# Patient Record
Sex: FEMALE
[De-identification: names, ages, dates, MRNs, and addresses within clinical notes are randomized per-mention and may not be internally consistent; named-entity substitution may affect disease eponyms.]

---

## 2022-01-28 ENCOUNTER — HOSPITAL ENCOUNTER (EMERGENCY)
Dept: HOSPITAL 5 - ED | Age: 77
LOS: 1 days | Discharge: PSYCHIATRIC OR SUBSTANCE USE FACILITY | End: 2022-01-29
Payer: MEDICARE

## 2022-01-28 DIAGNOSIS — F25.9: ICD-10-CM

## 2022-01-28 DIAGNOSIS — F31.9: Primary | ICD-10-CM

## 2022-01-28 DIAGNOSIS — N39.0: ICD-10-CM

## 2022-01-28 DIAGNOSIS — G93.41: ICD-10-CM

## 2022-01-28 DIAGNOSIS — Z20.822: ICD-10-CM

## 2022-01-28 LAB
ALBUMIN SERPL-MCNC: 3.7 G/DL (ref 3.9–5)
ALT SERPL-CCNC: 13 UNITS/L (ref 7–56)
BACTERIA #/AREA URNS HPF: (no result) /HPF
BASOPHILS # (AUTO): 0 K/MM3 (ref 0–0.1)
BASOPHILS NFR BLD AUTO: 0.5 % (ref 0–1.8)
BILIRUB DIRECT SERPL-MCNC: < 0.2 MG/DL (ref 0–0.2)
BILIRUB UR QL STRIP: (no result)
BLOOD UR QL VISUAL: (no result)
BUN SERPL-MCNC: 19 MG/DL (ref 7–17)
BUN/CREAT SERPL: 27 %
CALCIUM SERPL-MCNC: 9.3 MG/DL (ref 8.4–10.2)
EOSINOPHIL # BLD AUTO: 0 K/MM3 (ref 0–0.4)
EOSINOPHIL NFR BLD AUTO: 0.1 % (ref 0–4.3)
HCT VFR BLD CALC: 39.1 % (ref 30.3–42.9)
HEMOLYSIS INDEX: 38
HGB BLD-MCNC: 13 GM/DL (ref 10.1–14.3)
LYMPHOCYTES # BLD AUTO: 1.4 K/MM3 (ref 1.2–5.4)
LYMPHOCYTES NFR BLD AUTO: 16.1 % (ref 13.4–35)
MCHC RBC AUTO-ENTMCNC: 33 % (ref 30–34)
MCV RBC AUTO: 88 FL (ref 79–97)
MONOCYTES # (AUTO): 0.6 K/MM3 (ref 0–0.8)
MONOCYTES % (AUTO): 7.6 % (ref 0–7.3)
MUCOUS THREADS #/AREA URNS HPF: (no result) /HPF
PH UR STRIP: 5 [PH] (ref 5–7)
PLATELET # BLD: 274 K/MM3 (ref 140–440)
RBC # BLD AUTO: 4.44 M/MM3 (ref 3.65–5.03)
RBC #/AREA URNS HPF: 2 /HPF (ref 0–6)
UROBILINOGEN UR-MCNC: < 2 MG/DL (ref ?–2)
WBC #/AREA URNS HPF: 14 /HPF (ref 0–6)

## 2022-01-28 PROCEDURE — 87076 CULTURE ANAEROBE IDENT EACH: CPT

## 2022-01-28 PROCEDURE — 96375 TX/PRO/DX INJ NEW DRUG ADDON: CPT

## 2022-01-28 PROCEDURE — 87086 URINE CULTURE/COLONY COUNT: CPT

## 2022-01-28 PROCEDURE — 82140 ASSAY OF AMMONIA: CPT

## 2022-01-28 PROCEDURE — 87186 SC STD MICRODIL/AGAR DIL: CPT

## 2022-01-28 PROCEDURE — 80048 BASIC METABOLIC PNL TOTAL CA: CPT

## 2022-01-28 PROCEDURE — G0480 DRUG TEST DEF 1-7 CLASSES: HCPCS

## 2022-01-28 PROCEDURE — 70450 CT HEAD/BRAIN W/O DYE: CPT

## 2022-01-28 PROCEDURE — 80320 DRUG SCREEN QUANTALCOHOLS: CPT

## 2022-01-28 PROCEDURE — 86403 PARTICLE AGGLUT ANTBDY SCRN: CPT

## 2022-01-28 PROCEDURE — 36415 COLL VENOUS BLD VENIPUNCTURE: CPT

## 2022-01-28 PROCEDURE — 96372 THER/PROPH/DIAG INJ SC/IM: CPT

## 2022-01-28 PROCEDURE — 80076 HEPATIC FUNCTION PANEL: CPT

## 2022-01-28 PROCEDURE — 80307 DRUG TEST PRSMV CHEM ANLYZR: CPT

## 2022-01-28 PROCEDURE — 99285 EMERGENCY DEPT VISIT HI MDM: CPT

## 2022-01-28 PROCEDURE — 85025 COMPLETE CBC W/AUTO DIFF WBC: CPT

## 2022-01-28 PROCEDURE — 96365 THER/PROPH/DIAG IV INF INIT: CPT

## 2022-01-28 PROCEDURE — U0003 INFECTIOUS AGENT DETECTION BY NUCLEIC ACID (DNA OR RNA); SEVERE ACUTE RESPIRATORY SYNDROME CORONAVIRUS 2 (SARS-COV-2) (CORONAVIRUS DISEASE [COVID-19]), AMPLIFIED PROBE TECHNIQUE, MAKING USE OF HIGH THROUGHPUT TECHNOLOGIES AS DESCRIBED BY CMS-2020-01-R: HCPCS

## 2022-01-28 PROCEDURE — 81001 URINALYSIS AUTO W/SCOPE: CPT

## 2022-01-28 NOTE — EMERGENCY DEPARTMENT REPORT
HPI





- General


Chief Complaint: Altered Mental Status


Time Seen by Provider: 01/28/22 11:53





- HPI


HPI: 





76-year-old female with history of dementia and depression brought by EMS from a

nearby [facility] due to altered mental status/bizarre behavior.  Apparently, 

the patient was transferred from [facility] to the [facility] 

and has been there for the past 10 days of isolation.  She has been cleared to 

discontinue her isolation and was discharged but the S team that was supposed 

to transport her refused to do so because the patient became very anxious and 

was with altered mental status.  Every time the patient is asked a question, she

starts to answer for about 1 second but then hyperventilates and stops 

answering.  Calls made to the patient's sister, Deloris were apparently 

unanswered.  Her phone number is [phone].  Further details of the HPI are 

highly limited due to the patient's current clinical condition.





ED Past Medical Hx





- Past Medical History


Previous Medical History?: Yes


Additional medical history: UNKNOWN





- Medications


Home Medications: 


                                Home Medications











 Medication  Instructions  Recorded  Confirmed  Last Taken  Type


 


Celecoxib [celeBREX] 100 mg PO QDAY 01/28/22 01/28/22 Unknown History


 


Gabapentin 300 mg PO BID 01/28/22 01/28/22 Unknown History


 


Quetiapine Fumarate 25 mg PO TID 01/28/22 01/28/22 Unknown History


 


SEROquel 100 mg PO QHS 01/28/22 01/28/22 Unknown History


 


Vitamin D3 5,000 UNIT 5,000 units PO TID 01/28/22 01/28/22 Unknown History














ED Review of Systems


ROS: 


Stated complaint: ALTERED MENTAL STATUS


Other details as noted in HPI





Comment: Unobtainable due to pts medical conditions





Physical Exam





- Physical Exam


Vital Signs: 


                                   Vital Signs











  01/28/22





  11:40


 


Temperature 98.0 F


 


Pulse Rate 107 H


 


Respiratory 18





Rate 


 


Blood Pressure 154/100





[Left] 


 


O2 Sat by Pulse 98





Oximetry 











Physical Exam: 





GENERAL: Well developed and well nourished. No acute distress


HEAD: Normocephalic. No obvious signs of trauma. 


ENT: Dry mucous membranes.


EYES: Extraocular movements are intact. Pupils are equal round and reactive to 

light bilaterally


NECK: Supple. Full ROM is intact. Trachea is midline.


LUNGS: Nonlabored breathing. Equal chest rise bilaterally. Clear to auscultation

 bilaterally.


CARDIOVASCULAR: Regular rate and rhythm. No murmurs or rubs.


VASCULAR: Cap refill < 2 seconds. 1+ edema of the bilateral lower extremities


ABDOMEN: Abdomen is soft and nondistended. There is no significant tenderness, 

guarding or rebound.


SKIN: Skin is warm and dry


NEURO: Patient is awake, alert.  The patient can state her name but when asked 

other questions she begins to answer and then mumbles off.  She does however 

follow directions.. CNs II-XII grossly intact. No focal deficits. Normal motor 

and sensory exam throughout. 


MUSCULOSKELETAL: No obvious deformities. No significant tenderness. Normal ROM 

throughout. 


BACK/SPINE:  No costovertebral angle tenderness.





ED Course


                                   Vital Signs











  01/28/22





  11:40


 


Temperature 98.0 F


 


Pulse Rate 107 H


 


Respiratory 18





Rate 


 


Blood Pressure 154/100





[Left] 


 


O2 Sat by Pulse 98





Oximetry 














ED Medical Decision Making





- Lab Data


Result diagrams: 


                                 01/28/22 13:26





                                 01/29/22 16:47





                                   Lab Results











  01/28/22 01/28/22 01/28/22 Range/Units





  13:26 13:26 13:26 


 


WBC  8.5    (4.5-11.0)  K/mm3


 


RBC  4.44    (3.65-5.03)  M/mm3


 


Hgb  13.0    (10.1-14.3)  gm/dl


 


Hct  39.1    (30.3-42.9)  %


 


MCV  88    (79-97)  fl


 


MCH  29    (28-32)  pg


 


MCHC  33    (30-34)  %


 


RDW  12.1 L    (13.2-15.2)  %


 


Plt Count  274    (140-440)  K/mm3


 


Lymph % (Auto)  16.1    (13.4-35.0)  %


 


Mono % (Auto)  7.6 H    (0.0-7.3)  %


 


Eos % (Auto)  0.1    (0.0-4.3)  %


 


Baso % (Auto)  0.5    (0.0-1.8)  %


 


Lymph # (Auto)  1.4    (1.2-5.4)  K/mm3


 


Mono # (Auto)  0.6    (0.0-0.8)  K/mm3


 


Eos # (Auto)  0.0    (0.0-0.4)  K/mm3


 


Baso # (Auto)  0.0    (0.0-0.1)  K/mm3


 


Seg Neutrophils %  75.7 H    (40.0-70.0)  %


 


Seg Neutrophils #  6.5    (1.8-7.7)  K/mm3


 


Sodium   145   (137-145)  mmol/L


 


Potassium   3.2 L   (3.6-5.0)  mmol/L


 


Chloride   108.8 H   ()  mmol/L


 


Carbon Dioxide   24   (22-30)  mmol/L


 


Anion Gap   15   mmol/L


 


BUN   19 H   (7-17)  mg/dL


 


Creatinine   0.7   (0.6-1.2)  mg/dL


 


Estimated GFR   > 60   ml/min


 


BUN/Creatinine Ratio   27   %


 


Glucose   107 H   ()  mg/dL


 


Calcium   9.3   (8.4-10.2)  mg/dL


 


Total Bilirubin     (0.1-1.2)  mg/dL


 


Direct Bilirubin     (0-0.2)  mg/dL


 


Indirect Bilirubin     mg/dL


 


AST     (5-40)  units/L


 


ALT     (7-56)  units/L


 


Alkaline Phosphatase     ()  units/L


 


Ammonia     (25-60)  umol/L


 


Total Protein     (6.3-8.2)  g/dL


 


Albumin     (3.9-5)  g/dL


 


Albumin/Globulin Ratio     %


 


Urine Color     (Yellow)  


 


Urine Turbidity     (Clear)  


 


Urine pH     (5.0-7.0)  


 


Ur Specific Gravity     (1.003-1.030)  


 


Urine Protein     (Negative)  mg/dL


 


Urine Glucose (UA)     (Negative)  mg/dL


 


Urine Ketones     (Negative)  mg/dL


 


Urine Blood     (Negative)  


 


Urine Nitrite     (Negative)  


 


Urine Bilirubin     (Negative)  


 


Urine Urobilinogen     (<2.0)  mg/dL


 


Ur Leukocyte Esterase     (Negative)  


 


Urine WBC (Auto)     (0.0-6.0)  /HPF


 


Urine RBC (Auto)     (0.0-6.0)  /HPF


 


U Epithel Cells (Auto)     (0-13.0)  /HPF


 


Urine Bacteria (Auto)     (Negative)  /HPF


 


Urine Mucus     /HPF


 


Salicylates    < 0.3 L  (2.8-20.0)  mg/dL


 


Urine Opiates Screen     


 


Urine Methadone Screen     


 


Acetaminophen     (10.0-30.0)  ug/mL


 


Ur Barbiturates Screen     


 


Ur Phencyclidine Scrn     


 


Ur Amphetamines Screen     


 


U Benzodiazepines Scrn     


 


Urine Cocaine Screen     


 


U Marijuana (THC) Screen     


 


Drugs of Abuse Note     


 


Plasma/Serum Alcohol     (0-0.07)  %














  01/28/22 01/28/22 01/28/22 Range/Units





  13:26 13:26 13:26 


 


WBC     (4.5-11.0)  K/mm3


 


RBC     (3.65-5.03)  M/mm3


 


Hgb     (10.1-14.3)  gm/dl


 


Hct     (30.3-42.9)  %


 


MCV     (79-97)  fl


 


MCH     (28-32)  pg


 


MCHC     (30-34)  %


 


RDW     (13.2-15.2)  %


 


Plt Count     (140-440)  K/mm3


 


Lymph % (Auto)     (13.4-35.0)  %


 


Mono % (Auto)     (0.0-7.3)  %


 


Eos % (Auto)     (0.0-4.3)  %


 


Baso % (Auto)     (0.0-1.8)  %


 


Lymph # (Auto)     (1.2-5.4)  K/mm3


 


Mono # (Auto)     (0.0-0.8)  K/mm3


 


Eos # (Auto)     (0.0-0.4)  K/mm3


 


Baso # (Auto)     (0.0-0.1)  K/mm3


 


Seg Neutrophils %     (40.0-70.0)  %


 


Seg Neutrophils #     (1.8-7.7)  K/mm3


 


Sodium     (137-145)  mmol/L


 


Potassium     (3.6-5.0)  mmol/L


 


Chloride     ()  mmol/L


 


Carbon Dioxide     (22-30)  mmol/L


 


Anion Gap     mmol/L


 


BUN     (7-17)  mg/dL


 


Creatinine     (0.6-1.2)  mg/dL


 


Estimated GFR     ml/min


 


BUN/Creatinine Ratio     %


 


Glucose     ()  mg/dL


 


Calcium     (8.4-10.2)  mg/dL


 


Total Bilirubin    0.70  (0.1-1.2)  mg/dL


 


Direct Bilirubin    < 0.2  (0-0.2)  mg/dL


 


Indirect Bilirubin    0.5  mg/dL


 


AST    11  (5-40)  units/L


 


ALT    13  (7-56)  units/L


 


Alkaline Phosphatase    103  ()  units/L


 


Ammonia     (25-60)  umol/L


 


Total Protein    6.9  (6.3-8.2)  g/dL


 


Albumin    3.7 L  (3.9-5)  g/dL


 


Albumin/Globulin Ratio    1.2  %


 


Urine Color     (Yellow)  


 


Urine Turbidity     (Clear)  


 


Urine pH     (5.0-7.0)  


 


Ur Specific Gravity     (1.003-1.030)  


 


Urine Protein     (Negative)  mg/dL


 


Urine Glucose (UA)     (Negative)  mg/dL


 


Urine Ketones     (Negative)  mg/dL


 


Urine Blood     (Negative)  


 


Urine Nitrite     (Negative)  


 


Urine Bilirubin     (Negative)  


 


Urine Urobilinogen     (<2.0)  mg/dL


 


Ur Leukocyte Esterase     (Negative)  


 


Urine WBC (Auto)     (0.0-6.0)  /HPF


 


Urine RBC (Auto)     (0.0-6.0)  /HPF


 


U Epithel Cells (Auto)     (0-13.0)  /HPF


 


Urine Bacteria (Auto)     (Negative)  /HPF


 


Urine Mucus     /HPF


 


Salicylates     (2.8-20.0)  mg/dL


 


Urine Opiates Screen     


 


Urine Methadone Screen     


 


Acetaminophen  5.0 L    (10.0-30.0)  ug/mL


 


Ur Barbiturates Screen     


 


Ur Phencyclidine Scrn     


 


Ur Amphetamines Screen     


 


U Benzodiazepines Scrn     


 


Urine Cocaine Screen     


 


U Marijuana (THC) Screen     


 


Drugs of Abuse Note     


 


Plasma/Serum Alcohol   < 0.01   (0-0.07)  %














  01/28/22 01/28/22 01/28/22 Range/Units





  13:26 Unknown Unknown 


 


WBC     (4.5-11.0)  K/mm3


 


RBC     (3.65-5.03)  M/mm3


 


Hgb     (10.1-14.3)  gm/dl


 


Hct     (30.3-42.9)  %


 


MCV     (79-97)  fl


 


MCH     (28-32)  pg


 


MCHC     (30-34)  %


 


RDW     (13.2-15.2)  %


 


Plt Count     (140-440)  K/mm3


 


Lymph % (Auto)     (13.4-35.0)  %


 


Mono % (Auto)     (0.0-7.3)  %


 


Eos % (Auto)     (0.0-4.3)  %


 


Baso % (Auto)     (0.0-1.8)  %


 


Lymph # (Auto)     (1.2-5.4)  K/mm3


 


Mono # (Auto)     (0.0-0.8)  K/mm3


 


Eos # (Auto)     (0.0-0.4)  K/mm3


 


Baso # (Auto)     (0.0-0.1)  K/mm3


 


Seg Neutrophils %     (40.0-70.0)  %


 


Seg Neutrophils #     (1.8-7.7)  K/mm3


 


Sodium     (137-145)  mmol/L


 


Potassium     (3.6-5.0)  mmol/L


 


Chloride     ()  mmol/L


 


Carbon Dioxide     (22-30)  mmol/L


 


Anion Gap     mmol/L


 


BUN     (7-17)  mg/dL


 


Creatinine     (0.6-1.2)  mg/dL


 


Estimated GFR     ml/min


 


BUN/Creatinine Ratio     %


 


Glucose     ()  mg/dL


 


Calcium     (8.4-10.2)  mg/dL


 


Total Bilirubin     (0.1-1.2)  mg/dL


 


Direct Bilirubin     (0-0.2)  mg/dL


 


Indirect Bilirubin     mg/dL


 


AST     (5-40)  units/L


 


ALT     (7-56)  units/L


 


Alkaline Phosphatase     ()  units/L


 


Ammonia  12.0 L    (25-60)  umol/L


 


Total Protein     (6.3-8.2)  g/dL


 


Albumin     (3.9-5)  g/dL


 


Albumin/Globulin Ratio     %


 


Urine Color   Yellow   (Yellow)  


 


Urine Turbidity   Clear   (Clear)  


 


Urine pH   5.0   (5.0-7.0)  


 


Ur Specific Gravity   1.025   (1.003-1.030)  


 


Urine Protein   30 mg/dl   (Negative)  mg/dL


 


Urine Glucose (UA)   Neg   (Negative)  mg/dL


 


Urine Ketones   Neg   (Negative)  mg/dL


 


Urine Blood   Sm   (Negative)  


 


Urine Nitrite   Neg   (Negative)  


 


Urine Bilirubin   Neg   (Negative)  


 


Urine Urobilinogen   < 2.0   (<2.0)  mg/dL


 


Ur Leukocyte Esterase   Sm   (Negative)  


 


Urine WBC (Auto)   14.0 H   (0.0-6.0)  /HPF


 


Urine RBC (Auto)   2.0   (0.0-6.0)  /HPF


 


U Epithel Cells (Auto)   < 1.0   (0-13.0)  /HPF


 


Urine Bacteria (Auto)   1+   (Negative)  /HPF


 


Urine Mucus   Few   /HPF


 


Salicylates     (2.8-20.0)  mg/dL


 


Urine Opiates Screen    Negative  


 


Urine Methadone Screen    Negative  


 


Acetaminophen     (10.0-30.0)  ug/mL


 


Ur Barbiturates Screen    Negative  


 


Ur Phencyclidine Scrn    Negative  


 


Ur Amphetamines Screen    Negative  


 


U Benzodiazepines Scrn    Negative  


 


Urine Cocaine Screen    Negative  


 


U Marijuana (THC) Screen    Negative  


 


Drugs of Abuse Note    Disclamer  


 


Plasma/Serum Alcohol     (0-0.07)  %














- Radiology Data


Radiology results: report reviewed





- Medical Decision Making





76-year-old female with history of dementia and depression brought by EMS from a

 nearby [facility] due to altered mental status/bizarre behavior.  Apparently, 

the patient was transferred from [facility] to the [facility] 

and has been there for the past 10 days of isolation.  She has been cleared to 

discontinue her isolation and was discharged but the BLS team that was supposed 

to transport her refused to do so because the patient became very anxious and 

was with altered mental status.  Every time the patient is asked a question, she

 starts to answer for about 1 second but then hyperventilates and stops 

answering.  She is afebrile with normal vitals with the exception of mildly 

elevated heart rate in the 100s.  Although I suspect that her altered mental 

status/behavior may be related to an underlying psych disorder and underlying 

baseline dementia we will perform broad work-up with a full set of labs, CT of 

the head to assess for evidence of intracranial bleeding, mass, edema, or other 

finding to explain the patient's presentation.  We will give 500 cc of IV fluids

 and reassess.  We will also attempt to contact the patient's caregiver for 

further history.








The patient's nurse was able to get a hold of Katrina, the patient's caregiver who

 stated that the patient has a history of bipolar/schizoaffective disorder and 

that she gets regularly scheduled ECT treatments.  She was recently admitted to 

[facility] for suicidal ideation and homicidal ideation and 

this is where it was discovered that she had Covid.  When asked about the 

patient's current mental state and tendency to begin answering questions but 

then drifts off, she states that she has had this type of behavior in the past 

but it is not her baseline.  





With this further history, I feel that the patient's current presentation likely

 represents a combination of her psychiatric diagnosis and possible acute 

encephalopathy.  Therefore we will continue with the work-up.  In addition, I 

have placed an ED hold order and a consult to mental health for possible [facility] 

psych placement





Labs reveal no significant leukocytosis or anemia.  Kidney function is within 

normal limits however, BUN to creatinine ratio is 27 consistent with 

dehydration.  In addition, potassium is low at 3.2 which we will replete.





Urinalysis reveals findings consistent with urinary tract infection.  I have 

therefore ordered 1 dose of IV ceftriaxone and Keflex 500 mg twice daily x5 

days.





CT of the head reveals no acute abnormalities but chronic changes only.





On repeat assessment at 3:45, the patient is now more conversive and able to 

participate somewhat in conversation.  She states that she feels much better 

now.  She is noted to have elevated blood pressure of 200/100.  I have ordered 

IV labetalol





Patient's blood pressure is improved after labetalol.  She is medically cleared 

for psychiatric evaluation and placement.  She may require additional 

medications for her blood pressure should remain elevated.





Patient has been accepted as a transfer.


Critical care attestation.: 


If time is entered above; I have spent that time in minutes in the direct care 

of this critically ill patient, excluding procedure time.








ED Disposition


Clinical Impression: 


 UTI (urinary tract infection), Metabolic encephalopathy, Bipolar disorder, 

Schizoaffective disorder





Disposition: [address] [place] [address]


Is pt being admited?: No


Condition: Stable


Referrals: 


PRIMARY CARE,MD [Primary Care Provider] - 3-5 Days

## 2022-01-28 NOTE — CAT SCAN REPORT
CT BRAIN: 1/28/2022



INDICATION / CLINICAL INFORMATION:

AMS.



COMPARISON: 

None available.



FINDINGS:



BRAIN/INTRACRANIAL STRUCTURES: Unenhanced CT images of the brain demonstrate no evidence of acute abn
ormality.



Ventricles and sulci are prominent in size, consistent with prominent diffuse cerebral atrophy. Exten
sive chronic white matter hypoattenuation is present throughout the cerebral hemispheric white matter
.



There is no evidence of acute large vessel territory ischemic injury, hemorrhage, or mass. There are 
no abnormal extra-axial fluid collections.



Atherosclerotic vascular calcifications are present in the distal internal carotid arteries and verte
bral arteries.





EXTRACRANIAL STRUCTURES: Unremarkable.







IMPRESSION:

 No acute abnormality. Extensive chronic and age-related changes.









All CT scans at this location are performed using dose reduction to ALARA by means of automated expos
ure control.



Signer Name: Daniel Carvajal MD 

Signed: 1/28/2022 2:24 PM

Workstation Name: [id number]

## 2022-01-29 VITALS — DIASTOLIC BLOOD PRESSURE: 79 MMHG | SYSTOLIC BLOOD PRESSURE: 147 MMHG

## 2022-01-29 LAB
BUN SERPL-MCNC: 22 MG/DL (ref 7–17)
BUN/CREAT SERPL: 31 %
CALCIUM SERPL-MCNC: 9.1 MG/DL (ref 8.4–10.2)
HEMOLYSIS INDEX: 10

## 2022-01-29 NOTE — EVENT NOTE
Date: 01/29/22





76-year-old female with dementia and schizophrenia here with acute psychosis.  

Also diagnosed with UTI which is being treated with antibiotics.  She was seen 

by the mental health/psychiatry team who recommended further inpatient 

treatment.  There were no acute events overnight.  Vital signs reviewed and are 

stable.  Currently awaiting inpatient psychiatric facility placement.

## 2022-01-29 NOTE — CONSULTATION
History of Present Illness





- Reason for Consult


Consult date: 01/29/22


Reason for consult: anxious





- History of Present Psychiatric Illness


Per ER Note: 76-year-old female with history of dementia and depression brought 

by EMS from a nearby [facility] due to altered mental status/bizarre behavior. 

Apparently, the patient was transferred from [facility] to the 

[facility] and has been there for the past 10 days of isolation.  She has been 

cleared to discontinue her isolation and was discharged but the S team that 

was supposed to transport her refused to do so because the patient became very 

anxious and was with altered mental status.  Every time the patient is asked a 

question, she starts to answer for about 1 second but then hyperventilates and 

stops answering.  Calls made to the patient's sister, Deloris were apparently u

nanswered. Her phone number is [phone].  Further details of the HPI are 

highly limited due to the patient's current clinical condition.





The patient was seen today. She is a 77y/o female who was brought in by EMS from

a nearby [facility] for AMS. During my evaluation of the patient, she is lying 

down awake. She is a/o x 2. She patient has poor insight. She appears anxious. 

She is fidgety and moving about. She says she was living at a group home in 

[place], [place]. She denies SI/HI or hallucinations.





I spoke to the patient's sister, Deloris. She says the patient had a nervous 

breakdown years ago because she could not conceive. She says since then she's 

been in mental hospitals. She says recently the patient was homicidal and 

suicidal and that's why she initially took her to the hospital. She says the 

patient threw away years of important paperwork of her. She says the patient has

a history of bipolar and schizoaffective disorder, but hasn't been on her meds 

the way she needs to. She says the patient does not live in a group home but 

lives with her and has for about 18 years. Deloris says she would like the 

patient to be admitted to inpatient psych to get stable on her medications. 





PAST PSYCHIATRIC HISTORY:


Unable to obtain





PAST MEDICAL HISTORY: unknown





Family Psychiatric History: None reported or documented





SOCIAL HISTORY


Marital Status: 


Living Arrangements: with sister


Employment Status: Disabled


Access to guns/weapons: Denies


Education: 


History of Abuse:Denies


Legal History: Denies





REVIEW OF SYSTEMS  


Constitutional: Negative for weight loss


ENT: Negative for stridor


Respiratory: Negative for cough or hemoptysis


All other systems reviewed and are negative





MENTAL STATUS EXAMINATION


General Appearance and Behavior: Age appropriate, good hygiene, wearing 

appropriate clothes. anxious


Cooperation: Cooperative


Psychomotor Behavior: Psychomotor normal


Mood: anxious


Affect and affective range: Constricted


Thought Process: impaired


Thought Content: 


Speech: Normal tone and pace


Suicidal Ideation: Denies


Homicidal Ideation: Denies


Hallucinations: Denies


Delusions: Denies


Impulse Control: Limited


Insight and Judgment: Limited insight and fair judgment


Memory: Limited


Attention: distracted


Orientation: a/o x 3





 Assessment 


(1) Bipolar Disorder


Current Visit: Yes   Status: Acute





Treatment Plan


1013


Klonopin 0.25mg po BID


Restarted home meds


Disposition: Recommend acute psychiatric inpatient treatment


Will follow. Thanks


Case staffed with Dr. Rand





Medications and Allergies


                                    Allergies











Allergy/AdvReac Type Severity Reaction Status Date / Time


 


No Known Allergies Allergy   Unverified 01/28/22 13:04











                                Home Medications











 Medication  Instructions  Recorded  Confirmed  Last Taken  Type


 


Celecoxib [celeBREX] 100 mg PO QDAY 01/28/22 01/28/22 Unknown History


 


Gabapentin 300 mg PO BID 01/28/22 01/28/22 Unknown History


 


Quetiapine Fumarate 25 mg PO TID 01/28/22 01/28/22 Unknown History


 


SEROquel 100 mg PO QHS 01/28/22 01/28/22 Unknown History


 


Vitamin D3 5,000 UNIT 5,000 units PO TID 01/28/22 01/28/22 Unknown History











Active Meds: 


Active Medications





Cephalexin (Cephalexin 500 Mg Cap)  500 mg PO BID [facility] hospital; Protocol


   Stop: 02/02/22 21:59


   Last Admin: 01/29/22 09:46 Dose:  500 mg


   


Diphenhydramine HCl (Diphenhydramine 25 Mg Cap)  50 mg PO QHS PRN


   PRN Reason: Insomnia


   Last Admin: 01/28/22 23:33 Dose:  50 mg


   


Labetalol HCl (Labetalol 20 Mg/4 Ml Inj)  10 mg IV ONCE PRN


   PRN Reason: Hypertension


Lorazepam (Lorazepam 2 Mg/Ml Vial)  2 mg IM Q4HR PRN


   PRN Reason: Agitation


   Last Admin: 01/29/22 06:15 Dose:  2 mg


   


Potassium Chloride (Potassium Chloride Er 20 Meq Tab)  40 meq PO QDAY ENIO


   Last Admin: 01/29/22 09:45 Dose:  40 meq


   











Mental Status Exam





- Vital signs


                                Last Vital Signs











Temp  97.7 F   01/29/22 06:45


 


Pulse  76   01/29/22 08:01


 


Resp  25 H  01/29/22 08:01


 


BP  116/63   01/29/22 08:01


 


Pulse Ox  97   01/29/22 08:01














Results


Result Diagrams: 


                                 01/28/22 13:26





                                 01/28/22 13:26


                              Abnormal lab results











  01/28/22 01/28/22 01/28/22 Range/Units





  13:26 13:26 13:26 


 


RDW  12.1 L    (13.2-15.2)  %


 


Mono % (Auto)  7.6 H    (0.0-7.3)  %


 


Seg Neutrophils %  75.7 H    (40.0-70.0)  %


 


Potassium   3.2 L   (3.6-5.0)  mmol/L


 


Chloride   108.8 H   ()  mmol/L


 


BUN   19 H   (7-17)  mg/dL


 


Glucose   107 H   ()  mg/dL


 


Ammonia     (25-60)  umol/L


 


Albumin     (3.9-5)  g/dL


 


Urine WBC (Auto)     (0.0-6.0)  /HPF


 


Salicylates    < 0.3 L  (2.8-20.0)  mg/dL


 


Acetaminophen     (10.0-30.0)  ug/mL














  01/28/22 01/28/22 01/28/22 Range/Units





  13:26 13:26 13:26 


 


RDW     (13.2-15.2)  %


 


Mono % (Auto)     (0.0-7.3)  %


 


Seg Neutrophils %     (40.0-70.0)  %


 


Potassium     (3.6-5.0)  mmol/L


 


Chloride     ()  mmol/L


 


BUN     (7-17)  mg/dL


 


Glucose     ()  mg/dL


 


Ammonia    12.0 L  (25-60)  umol/L


 


Albumin   3.7 L   (3.9-5)  g/dL


 


Urine WBC (Auto)     (0.0-6.0)  /HPF


 


Salicylates     (2.8-20.0)  mg/dL


 


Acetaminophen  5.0 L    (10.0-30.0)  ug/mL














  01/28/22 Range/Units





  Unknown 


 


RDW   (13.2-15.2)  %


 


Mono % (Auto)   (0.0-7.3)  %


 


Seg Neutrophils %   (40.0-70.0)  %


 


Potassium   (3.6-5.0)  mmol/L


 


Chloride   ()  mmol/L


 


BUN   (7-17)  mg/dL


 


Glucose   ()  mg/dL


 


Ammonia   (25-60)  umol/L


 


Albumin   (3.9-5)  g/dL


 


Urine WBC (Auto)  14.0 H  (0.0-6.0)  /HPF


 


Salicylates   (2.8-20.0)  mg/dL


 


Acetaminophen   (10.0-30.0)  ug/mL








All other labs normal.

## 2022-08-05 ENCOUNTER — HOSPITAL ENCOUNTER (INPATIENT)
Dept: HOSPITAL 5 - 3A | Age: 77
LOS: 5 days | Discharge: HOME | DRG: 885 | End: 2022-08-10
Attending: PSYCHIATRY & NEUROLOGY | Admitting: PSYCHIATRY & NEUROLOGY
Payer: MEDICARE

## 2022-08-05 DIAGNOSIS — F41.1: ICD-10-CM

## 2022-08-05 DIAGNOSIS — F01.51: ICD-10-CM

## 2022-08-05 DIAGNOSIS — M19.90: ICD-10-CM

## 2022-08-05 DIAGNOSIS — Z82.49: ICD-10-CM

## 2022-08-05 DIAGNOSIS — G62.9: ICD-10-CM

## 2022-08-05 DIAGNOSIS — I67.2: ICD-10-CM

## 2022-08-05 DIAGNOSIS — F25.9: Primary | ICD-10-CM

## 2022-08-05 DIAGNOSIS — F31.9: ICD-10-CM

## 2022-08-05 PROCEDURE — 80053 COMPREHEN METABOLIC PANEL: CPT

## 2022-08-05 PROCEDURE — 82962 GLUCOSE BLOOD TEST: CPT

## 2022-08-05 PROCEDURE — 85025 COMPLETE CBC W/AUTO DIFF WBC: CPT

## 2022-08-05 PROCEDURE — 84443 ASSAY THYROID STIM HORMONE: CPT

## 2022-08-05 PROCEDURE — 36415 COLL VENOUS BLD VENIPUNCTURE: CPT

## 2022-08-05 PROCEDURE — 80074 ACUTE HEPATITIS PANEL: CPT

## 2022-08-05 PROCEDURE — 80061 LIPID PANEL: CPT

## 2022-08-05 PROCEDURE — 83036 HEMOGLOBIN GLYCOSYLATED A1C: CPT

## 2022-08-05 RX ADMIN — GABAPENTIN SCH MG: 300 CAPSULE ORAL at 21:01

## 2022-08-05 NOTE — CONSULTATION
History of Present Illness





- Reason for Consult


Consult date: 08/05/22


Medical management


Requesting physician: AIXA CHUN





- History of Present Illness


77 YO Female with Vascular Dementia with behavioral disturbance, Cerebral 

Atherosclerosis, OA, Peripheral Neuropathy, Bipolar Disorder, Schizophrenia, 

MDD, RENAE admitted to [facility] for psychiatric stabilization.  Consult 

placed by Dr. Chun for medical management. Patient seen and evaluated in the 

recreation room.  Patient denies fever, chills, chest pain, palpitation, 

adductive cough, skin rash and recent contact, no supportive COVID-19.  Patient 

appears to be at baseline level of cognition and function.  No reported nursing 

events.  Patient appears to be at baseline level of cognition and function.

















Past History


Past Medical History: other (See HPI)


Past Surgical History: No surgical history, Other (Reviewed)


Social history: single.  denies: smoking, alcohol abuse


Family history: hypertension





Medications and Allergies


                                    Allergies











Allergy/AdvReac Type Severity Reaction Status Date / Time


 


No Known Allergies Allergy   Unverified 01/28/22 13:04











                                Home Medications











 Medication  Instructions  Recorded  Confirmed  Last Taken  Type


 


Celecoxib [celeBREX] 100 mg PO QDAY 01/28/22 08/05/22 Unknown History


 


Gabapentin 300 mg PO BID 01/28/22 01/28/22 Unknown History


 


Gabapentin [Neurontin] 300 mg PO TID 08/05/22 08/05/22 Unknown History











Active Meds: 


Active Medications





Celecoxib (Celecoxib 100 Mg Cap)  100 mg PO QDAY [facility]


Cholecalciferol (Cholecalciferol (Vit D3) 5,000 Unit Tab)  5,000 unit PO QDAY 

[facility]


Gabapentin (Gabapentin 300 Mg Cap)  300 mg PO BID [facility]


Quetiapine Fumarate (Quetiapine 100 Mg Tab)  100 mg PO QHS [facility]











Review of Systems


Constitutional: no weight loss, no weight gain, no fever, no chills


Ears, nose, mouth and throat: no ear pain, no nose pain, no nasal congestion, no

 nasal discharge, no sinus pressure


Cardiovascular: no chest pain, no palpitations, no edema, no syncope


Respiratory: no cough, no cough with sputum, no hemoptysis


Gastrointestinal: no abdominal pain, no nausea, no constipation, no change in b

owel habits


Genitourinary Female: no pelvic pain, no flank pain, no dysuria, no urinary 

frequency, no urgency


Rectal: no pain, no incontinence, no bleeding


Musculoskeletal: no neck stiffness, no shooting arm pain, no low back pain, no 

shooting leg pain


Integumentary: no rash, no sores, no jaundice


Neurological: no transient paralysis, no paralysis, no parathesias, no seizures,

 no syncope, no tremors


Psychiatric: depression, anxiety attacks, irritability, mood swings


Endocrine: no cold intolerance, no polyphagia, no polydipsia, no nocturia


Hematologic/Lymphatic: no easy bruising, no easy bleeding, no lymphadenopathy


Allergic/Immunologic: no urticaria, no allergic rhinitis





Exam





- Constitutional


General appearance: Present: no acute distress





- EENT


Eyes: Present: PERRL


ENT: hearing decreased





- Neck


Neck: Present: supple, normal ROM





- Respiratory


Respiratory effort: normal


Respiratory: bilateral: CTA





- Cardiovascular


Heart Sounds: Present: S1 & S2.  Absent: rub, click





- Extremities


Extremities: pulses symmetrical, No edema


Peripheral Pulses: within normal limits





- Abdominal


General gastrointestinal: Present: soft, non-tender, non-distended, normal bowel

 sounds


Female genitourinary: Present: normal





- Integumentary


Integumentary: Present: clear, warm, dry





- Musculoskeletal


Musculoskeletal: gait normal, strength equal bilaterally





- Psychiatric


Psychiatric: cooperative, agitated





- Neurologic


Neurologic: CNII-XII intact, moves all extremities





Assessment and Plan





- Patient Problems


(1) Vascular dementia with behavioral disturbance


Current Visit: Yes   Status: Acute   


Plan to address problem: 


Verbal prompting, verbal redirection, benzodiazepine therapy as clinically christina

cated.








(2) Cerebral atherosclerosis


Current Visit: Yes   Status: Acute   


Plan to address problem: 


Risk factor reduction, antiplatelet therapy, supportive care








(3) Osteoarthritis


Current Visit: Yes   Status: Acute   


Plan to address problem: 


Pain control, supportive care.








(4) Major depression


Current Visit: Yes   Status: Acute   


Plan to address problem: 


Continue medical management, supportive care.








(5) Bipolar 1 disorder


Current Visit: Yes   Status: Acute   


Plan to address problem: 


Continue medical management, supportive care








(6) Schizophrenia


Current Visit: Yes   Status: Acute   


Plan to address problem: 


Continue medical management, supportive care.








(7) Advance care planning


Current Visit: Yes   Status: Acute   


Plan to address problem: 


Disease education data, care plan discussed, diagnoses discussed, prognosis 

discussed, patient is full code, +30 minutes.








(8) Preventative health care


Current Visit: Yes   Status: Acute   


Plan to address problem: 


Patient counseled regarding home safety, compliant with medication, outpatient 

follow-up with primary care physician for all age and risk factor appropriate 

screening test.  +30 minutes.

## 2022-08-06 LAB
ALBUMIN SERPL-MCNC: 3.7 G/DL (ref 3.9–5)
ALT SERPL-CCNC: 7 UNITS/L (ref 7–56)
BASOPHILS # (AUTO): 0 K/MM3 (ref 0–0.1)
BASOPHILS NFR BLD AUTO: 0.4 % (ref 0–1.8)
BUN SERPL-MCNC: 20 MG/DL (ref 7–17)
BUN/CREAT SERPL: 29 %
CALCIUM SERPL-MCNC: 9.7 MG/DL (ref 8.4–10.2)
EOSINOPHIL # BLD AUTO: 0 K/MM3 (ref 0–0.4)
EOSINOPHIL NFR BLD AUTO: 0.4 % (ref 0–4.3)
HCT VFR BLD CALC: 37.6 % (ref 30.3–42.9)
HDLC SERPL-MCNC: 50 MG/DL (ref 40–59)
HEMOLYSIS INDEX: 20
HGB BLD-MCNC: 12.5 GM/DL (ref 10.1–14.3)
LYMPHOCYTES # BLD AUTO: 1.4 K/MM3 (ref 1.2–5.4)
LYMPHOCYTES NFR BLD AUTO: 17.6 % (ref 13.4–35)
MCHC RBC AUTO-ENTMCNC: 33 % (ref 30–34)
MCV RBC AUTO: 86 FL (ref 79–97)
MONOCYTES # (AUTO): 0.7 K/MM3 (ref 0–0.8)
MONOCYTES % (AUTO): 8.5 % (ref 0–7.3)
PLATELET # BLD: 275 K/MM3 (ref 140–440)
RBC # BLD AUTO: 4.37 M/MM3 (ref 3.65–5.03)

## 2022-08-06 RX ADMIN — GABAPENTIN SCH MG: 300 CAPSULE ORAL at 22:16

## 2022-08-06 RX ADMIN — GABAPENTIN SCH MG: 300 CAPSULE ORAL at 09:23

## 2022-08-06 RX ADMIN — DONEPEZIL HYDROCHLORIDE SCH MG: 5 TABLET, FILM COATED ORAL at 22:16

## 2022-08-06 RX ADMIN — CHOLECALCIFEROL TAB 125 MCG (5000 UNIT) SCH UNIT: 125 TAB at 09:24

## 2022-08-06 RX ADMIN — CELECOXIB SCH MG: 100 CAPSULE ORAL at 09:23

## 2022-08-06 NOTE — HISTORY AND PHYSICAL REPORT
GP History & Physical





- History of Present Illness


Date of admission: 08/05/22


Date of Examination: 08/06/22


Reason for Admission: Danger to self, Danger to others, Failure of Outpatient 

Treatment


History of Present Illness: 


The patient is a 76 year old female with history of schizophrenia,and bipolar 

disorder who was admitted for stabilization. The patients presenting symptom at 

the ED include suicidal ideation, agitation, and  noncompliant with medications.

The patient was seen today. She reports worsening depression and anxiety " my 

caretaker wanted me to leave the house because I was not doing what I was 

supposed to be doing at home, I was not not cleaning,  staying in bed and taking

my medications late." She rates her depression and anxiety as 5/10. She denies 

suicidal ideation today " I had some earlier but I couldn't do anything." She 

denies hallucinations. 





PAST PSYCHIATRIC HISTORY: 


Diagnoses: schizophrenia, bipolar


Suicide attempts or Self-harm behavior: Yes


Prior psychiatric hospitalizations: Yes


Substance Abuse history: Denies


Previous psychiatric medications tried: Unable to recall


Outpatient treatment: Yes


PAST MEDICAL HISTORY: 


Family Psychiatric History


None reported or documented


SOCIAL HISTORY


Marital Status: 


Living Arrangements: Lives with a caretaker


Employment Status: Retired


Access to guns/weapons: Denies


Education: College


History of Abuse: Denies


Legal History: Denies


REVIEW OF SYSTEMS 


Constitutional: Negative for weight loss


ENT: Negative for stridor


Respiratory: Negative for cough or hemoptysis


All other systems reviewed and are negative


MENTAL STATUS


General Appearance and Behavior: age appropriate, good eye contact, cooperative,

calm, pleasant


Cooperation: Cooperative


Psychomotor Behavior: within normal limits


Mood: Depressed and anxious


Affect and affective range: Congruent with stated mood


Thought Process: goal oriented


Thought Content: reality oriented


Speech: Normal volume and Regular rate and rhythm


Suicidal Ideation: Denies


Homicidal Ideation: Denies HI


Hallucinations:Denies


Delusions: None elicited


Impulse Control: Limited


Insight and Judgment: Limited


Memory: Limited


Attention: Attentive


Orientation: alert and oriented





Diagnosis: Schizoaffective





Treatment Plan 


Patient will be admitted for inpatient psychiatric evaluation, medication 

adjustment and close monitoring


The patient's behavior, mood, sleep and appetite will be closely monitored.


Patient will be enrolled in individual and group therapeutic sessions and 

encouraged to attend.


Patient will be provided with a safe and structured environment.


Patient's physical health needs will be addressed by the Hospitalist. 

Hospitalist Consulted 


Labs including CBC, CMP, Lipid profile and Hemoglobin A1C ordered 


Social Assessment will be completed and the  will work with patient

and family to ensure a suitable and safe disposition


Medication adjustment will be made as clinically indicated


Usual Wellness Restoration/Preservation:


-Continue home meds


The patient agreed on the treatment plan, understood the risk, benefit, 

alternative treatment, potential consequence of no treatment, and gave informed 

consent.


Legal Status: voluntary


Reaction to Hospitalization: Accepting





Medications and Allergies


Patient Problems: 


Current Active Problems





Advance care planning (Acute)


Bipolar 1 disorder (Acute)


Cerebral atherosclerosis (Acute)


Major depression (Acute)


Osteoarthritis (Acute)


Preventative health care (Acute)


Schizophrenia (Acute)


Vascular dementia with behavioral disturbance (Acute)











Medications and Allergies


                                    Allergies











Allergy/AdvReac Type Severity Reaction Status Date / Time


 


No Known Allergies Allergy   Unverified 01/28/22 13:04











                                Home Medications











 Medication  Instructions  Recorded  Confirmed  Last Taken  Type


 


Celecoxib [celeBREX] 100 mg PO QDAY 01/28/22 08/05/22 Unknown History


 


Gabapentin 300 mg PO BID 01/28/22 08/05/22 Unknown History


 


Gabapentin [Neurontin] 300 mg PO TID 08/05/22 08/05/22 Unknown History


 


QUEtiapine [SEROquel] 50 mg PO QHS 08/05/22 08/05/22 Unknown History


 


donepeziL [Aricept] 5 mg PO QHS 08/05/22 08/05/22 Unknown History


 


hydrOXYzine PAMOATE [Vistaril] 1 cap PO BID 08/05/22 08/05/22 Unknown History


 


traZODone [Desyrel] 50 mg PO QHS 08/05/22 08/05/22 Unknown History











Active Meds: 


Active Medications





Celecoxib (Celecoxib 100 Mg Cap)  100 mg PO QDAY [facility]


Cholecalciferol (Cholecalciferol (Vit D3) 5,000 Unit Tab)  5,000 unit PO QDAY 

[facility]


Gabapentin (Gabapentin 300 Mg Cap)  300 mg PO BID [facility]


   Last Admin: 08/05/22 21:01 Dose:  300 mg


   


Quetiapine Fumarate (Quetiapine 100 Mg Tab)  100 mg PO QHS [facility]


   Last Admin: 08/05/22 21:01 Dose:  100 mg


   











Results





- Results


Labs/Vitals: 


                             Laboratory Last Values











WBC  7.8 K/mm3 (4.5-11.0)   08/06/22  00:19    


 


RBC  4.37 M/mm3 (3.65-5.03)   08/06/22  00:19    


 


Hgb  12.5 gm/dl (10.1-14.3)   08/06/22  00:19    


 


Hct  37.6 % (30.3-42.9)   08/06/22  00:19    


 


MCV  86 fl (79-97)   08/06/22  00:19    


 


MCH  29 pg (28-32)   08/06/22  00:19    


 


MCHC  33 % (30-34)   08/06/22  00:19    


 


RDW  13.5 % (13.2-15.2)   08/06/22  00:19    


 


Plt Count  275 K/mm3 (140-440)   08/06/22  00:19    


 


Lymph % (Auto)  17.6 % (13.4-35.0)   08/06/22  00:19    


 


Mono % (Auto)  8.5 % (0.0-7.3)  H  08/06/22  00:19    


 


Eos % (Auto)  0.4 % (0.0-4.3)   08/06/22  00:19    


 


Baso % (Auto)  0.4 % (0.0-1.8)   08/06/22  00:19    


 


Lymph # (Auto)  1.4 K/mm3 (1.2-5.4)   08/06/22  00:19    


 


Mono # (Auto)  0.7 K/mm3 (0.0-0.8)   08/06/22  00:19    


 


Eos # (Auto)  0.0 K/mm3 (0.0-0.4)   08/06/22  00:19    


 


Baso # (Auto)  0.0 K/mm3 (0.0-0.1)   08/06/22  00:19    


 


Seg Neutrophils %  73.1 % (40.0-70.0)  H  08/06/22  00:19    


 


Seg Neutrophils #  5.7 K/mm3 (1.8-7.7)   08/06/22  00:19    


 


Sodium  144 mmol/L (137-145)   08/06/22  00:19    


 


Potassium  4.2 mmol/L (3.6-5.0)   08/06/22  00:19    


 


Chloride  107.4 mmol/L ()  H  08/06/22  00:19    


 


Carbon Dioxide  25 mmol/L (22-30)   08/06/22  00:19    


 


Anion Gap  16 mmol/L  08/06/22  00:19    


 


BUN  20 mg/dL (7-17)  H  08/06/22  00:19    


 


Creatinine  0.7 mg/dL (0.6-1.2)   08/06/22  00:19    


 


Estimated GFR  > 60 ml/min  08/06/22  00:19    


 


BUN/Creatinine Ratio  29 %  08/06/22  00:19    


 


Glucose  106 mg/dL ()  H  08/06/22  00:19    


 


POC Glucose  174 mg/dL ()  H  08/05/22  20:24    


 


Hemoglobin A1c  6.0 % (4-6)   08/06/22  00:19    


 


Calcium  9.7 mg/dL (8.4-10.2)   08/06/22  00:19    


 


Total Bilirubin  0.30 mg/dL (0.1-1.2)   08/06/22  00:19    


 


AST  11 units/L (5-40)   08/06/22  00:19    


 


ALT  7 units/L (7-56)   08/06/22  00:19    


 


Alkaline Phosphatase  179 units/L ()  H  08/06/22  00:19    


 


Total Protein  6.7 g/dL (6.3-8.2)   08/06/22  00:19    


 


Albumin  3.7 g/dL (3.9-5)  L  08/06/22  00:19    


 


Albumin/Globulin Ratio  1.2 %  08/06/22  00:19    


 


Triglycerides  90 mg/dL (2-149)   08/06/22  00:19    


 


Cholesterol  182 mg/dL ()   08/06/22  00:19    


 


LDL Cholesterol Direct  111 mg/dL ()   08/06/22  00:19    


 


HDL Cholesterol  50 mg/dL (40-59)   08/06/22  00:19    


 


Cholesterol/HDL Ratio  3.64 %  08/06/22  00:19    


 


TSH  2.170 mlU/mL (0.270-4.200)   08/06/22  00:19    


 


Hepatitis A IgM Ab  Non-reactive  (NonReactive)   08/06/22  00:19    


 


Hep Bs Antigen  Non-reactive  (Negative)   08/06/22  00:19    


 


Hep B Core IgM Ab  Non-reactive  (NonReactive)   08/06/22  00:19    


 


Hepatitis C Antibody  Non-reactive  (NonReactive)   08/06/22  00:19    








                                Last Vital Signs











Temp  98.7 F   08/05/22 22:00


 


Pulse  99 H  08/05/22 22:00


 


Resp  18   08/05/22 22:00


 


BP  148/87   08/05/22 22:00


 


Pulse Ox  95   08/05/22 22:00














Physical Examination





- Constitutional


Vitals: 


                                   Vital Signs











Temp Pulse Resp BP Pulse Ox


 


 98.7 F   99 H  18   148/87   95 


 


 08/05/22 22:00  08/05/22 22:00  08/05/22 22:00  08/05/22 22:00  08/05/22 22:00








                           Temperature -Last 24 Hours











Temperature                    98.7 F


 


Temperature                    98.7 F

















Mental Status Exam





- Vital signs


                                Last Vital Signs











Temp  98.7 F   08/05/22 22:00


 


Pulse  99 H  08/05/22 22:00


 


Resp  18   08/05/22 22:00


 


BP  148/87   08/05/22 22:00


 


Pulse Ox  95   08/05/22 22:00














Physician Certification





- Certification Statement


Physician Certification Statement: 


This is an acknowledgement statement that STEPHEN STARK is a 76 year old F who 

requires inpatient psychiatric admission for treatment which could reasonably be

 expected to improve the patient's condition for 





Estimated period of time patient will need to remain in the hospital: [ ]





Plan for post-hospital care: [ ]

## 2022-08-07 RX ADMIN — GABAPENTIN SCH MG: 300 CAPSULE ORAL at 10:18

## 2022-08-07 RX ADMIN — DONEPEZIL HYDROCHLORIDE SCH MG: 5 TABLET, FILM COATED ORAL at 21:26

## 2022-08-07 RX ADMIN — GABAPENTIN SCH MG: 300 CAPSULE ORAL at 21:26

## 2022-08-07 RX ADMIN — CELECOXIB SCH MG: 100 CAPSULE ORAL at 10:18

## 2022-08-07 RX ADMIN — CHOLECALCIFEROL TAB 125 MCG (5000 UNIT) SCH UNIT: 125 TAB at 10:18

## 2022-08-07 NOTE — PROGRESS NOTE
Subjective


Date of service: 08/07/22


Subjective Comment: 


08/07: The patient was seen this morning at breakfast. She reports feeling 

better. The patient continues to endorse depression, rates as 5/10. She reports 

sleep and appetite as good. The patient denies any current suicidal/homicidal 

ideation and denies hallucinations. 





REVIEW OF SYSTEMS 


Constitutional: Negative for weight loss


ENT: Negative for stridor


Respiratory: Negative for cough or hemoptysis


All other systems reviewed and are negative


MENTAL STATUS


General Appearance and Behavior: age appropriate, good eye contact, cooperative,

calm


Cooperation: Cooperative


Psychomotor Behavior: within normal limits


Mood: Depressed


Affect and affective range: Congruent with stated mood


Thought Process: goal oriented


Thought Content: reality oriented


Speech: Normal volume and Regular rate and rhythm


Suicidal Ideation: Denies


Homicidal Ideation: Denies HI


Hallucinations:Denies


Delusions: None elicited


Impulse Control: Limited


Insight and Judgment: Limited


Memory: Limited


Attention: Attentive


Orientation: alert and oriented





Diagnosis: Schizoaffective





Treatment Plan 


Patient will be admitted for inpatient psychiatric evaluation, medication 

adjustment and close monitoring


The patient's behavior, mood, sleep and appetite will be closely monitored.


Patient will be enrolled in individual and group therapeutic sessions and 

encouraged to attend.


Patient will be provided with a safe and structured environment.


Patient's physical health needs will be addressed by the Hospitalist. 

Hospitalist Consulted 


Labs including CBC, CMP, Lipid profile and Hemoglobin A1C ordered 


Social Assessment will be completed and the  will work with patient

and family to ensure a suitable and safe disposition


Medication adjustment will be made as clinically indicated


Usual Wellness Restoration/Preservation:


-Continue home meds


The patient agreed on the treatment plan, understood the risk, benefit, 

alternative treatment, potential consequence of no treatment, and gave informed 

consent.


Legal Status: voluntary


Reaction to Hospitalization: Accepting





Medications and Allergies


Patient Problems: 


Current Active Problems





Advance care planning (Acute)


Bipolar 1 disorder (Acute)


Cerebral atherosclerosis (Acute)


Major depression (Acute)


Osteoarthritis (Acute)


Preventative health care (Acute)


Schizophrenia (Acute)


Vascular dementia with behavioral disturbance (Acute)











Medications and Allergies





Medications and Allergies


                                    Allergies











Allergy/AdvReac Type Severity Reaction Status Date / Time


 


No Known Allergies Allergy   Unverified 01/28/22 13:04











                                Home Medications











 Medication  Instructions  Recorded  Confirmed  Last Taken  Type


 


Celecoxib [celeBREX] 100 mg PO QDAY 01/28/22 08/05/22 Unknown History


 


Gabapentin 300 mg PO BID 01/28/22 08/05/22 Unknown History


 


Gabapentin [Neurontin] 300 mg PO TID 08/05/22 08/05/22 Unknown History


 


QUEtiapine [SEROquel] 50 mg PO QHS 08/05/22 08/05/22 Unknown History


 


donepeziL [Aricept] 5 mg PO QHS 08/05/22 08/05/22 Unknown History


 


hydrOXYzine PAMOATE [Vistaril] 1 cap PO BID 08/05/22 08/05/22 Unknown History


 


traZODone [Desyrel] 50 mg PO QHS 08/05/22 08/05/22 Unknown History











Active Meds: 


Active Medications





Celecoxib (Celecoxib 100 Mg Cap)  100 mg PO QDAY [facility]


   Last Admin: 08/06/22 09:23 Dose:  100 mg


   


Cholecalciferol (Cholecalciferol (Vit D3) 5,000 Unit Tab)  5,000 unit PO QDAY 

[facility]


   Last Admin: 08/06/22 09:24 Dose:  5,000 unit


   


Donepezil HCl (Donepezil 5 Mg Tab)  5 mg PO QHS [facility]


   Last Admin: 08/06/22 22:16 Dose:  5 mg


   


Gabapentin (Gabapentin 300 Mg Cap)  300 mg PO BID [facility]


   Last Admin: 08/06/22 22:16 Dose:  300 mg


   


Hydroxyzine Pamoate (Hydroxyzine Pamoate 25 Mg Cap)  25 mg PO BID [facility]


   Last Admin: 08/06/22 22:17 Dose:  25 mg


   


Quetiapine Fumarate (Quetiapine 25 Mg Tab)  50 mg PO QHS [facility]


   Last Admin: 08/06/22 22:17 Dose:  50 mg


   











Results





- Results


Labs/Vitals: 


                             Laboratory Last Values











WBC  7.8 K/mm3 (4.5-11.0)   08/06/22  00:19    


 


RBC  4.37 M/mm3 (3.65-5.03)   08/06/22  00:19    


 


Hgb  12.5 gm/dl (10.1-14.3)   08/06/22  00:19    


 


Hct  37.6 % (30.3-42.9)   08/06/22  00:19    


 


MCV  86 fl (79-97)   08/06/22  00:19    


 


MCH  29 pg (28-32)   08/06/22  00:19    


 


MCHC  33 % (30-34)   08/06/22  00:19    


 


RDW  13.5 % (13.2-15.2)   08/06/22  00:19    


 


Plt Count  275 K/mm3 (140-440)   08/06/22  00:19    


 


Lymph % (Auto)  17.6 % (13.4-35.0)   08/06/22  00:19    


 


Mono % (Auto)  8.5 % (0.0-7.3)  H  08/06/22  00:19    


 


Eos % (Auto)  0.4 % (0.0-4.3)   08/06/22  00:19    


 


Baso % (Auto)  0.4 % (0.0-1.8)   08/06/22  00:19    


 


Lymph # (Auto)  1.4 K/mm3 (1.2-5.4)   08/06/22  00:19    


 


Mono # (Auto)  0.7 K/mm3 (0.0-0.8)   08/06/22  00:19    


 


Eos # (Auto)  0.0 K/mm3 (0.0-0.4)   08/06/22  00:19    


 


Baso # (Auto)  0.0 K/mm3 (0.0-0.1)   08/06/22  00:19    


 


Seg Neutrophils %  73.1 % (40.0-70.0)  H  08/06/22  00:19    


 


Seg Neutrophils #  5.7 K/mm3 (1.8-7.7)   08/06/22  00:19    


 


Sodium  144 mmol/L (137-145)   08/06/22  00:19    


 


Potassium  4.2 mmol/L (3.6-5.0)   08/06/22  00:19    


 


Chloride  107.4 mmol/L ()  H  08/06/22  00:19    


 


Carbon Dioxide  25 mmol/L (22-30)   08/06/22  00:19    


 


Anion Gap  16 mmol/L  08/06/22  00:19    


 


BUN  20 mg/dL (7-17)  H  08/06/22  00:19    


 


Creatinine  0.7 mg/dL (0.6-1.2)   08/06/22  00:19    


 


Estimated GFR  > 60 ml/min  08/06/22  00:19    


 


BUN/Creatinine Ratio  29 %  08/06/22  00:19    


 


Glucose  106 mg/dL ()  H  08/06/22  00:19    


 


POC Glucose  174 mg/dL ()  H  08/05/22  20:24    


 


Hemoglobin A1c  6.0 % (4-6)   08/06/22  00:19    


 


Calcium  9.7 mg/dL (8.4-10.2)   08/06/22  00:19    


 


Total Bilirubin  0.30 mg/dL (0.1-1.2)   08/06/22  00:19    


 


AST  11 units/L (5-40)   08/06/22  00:19    


 


ALT  7 units/L (7-56)   08/06/22  00:19    


 


Alkaline Phosphatase  179 units/L ()  H  08/06/22  00:19    


 


Total Protein  6.7 g/dL (6.3-8.2)   08/06/22  00:19    


 


Albumin  3.7 g/dL (3.9-5)  L  08/06/22  00:19    


 


Albumin/Globulin Ratio  1.2 %  08/06/22  00:19    


 


Triglycerides  90 mg/dL (2-149)   08/06/22  00:19    


 


Cholesterol  182 mg/dL ()   08/06/22  00:19    


 


LDL Cholesterol Direct  111 mg/dL ()   08/06/22  00:19    


 


HDL Cholesterol  50 mg/dL (40-59)   08/06/22  00:19    


 


Cholesterol/HDL Ratio  3.64 %  08/06/22  00:19    


 


TSH  2.170 mlU/mL (0.270-4.200)   08/06/22  00:19    


 


Hepatitis A IgM Ab  Non-reactive  (NonReactive)   08/06/22  00:19    


 


Hep Bs Antigen  Non-reactive  (Negative)   08/06/22  00:19    


 


Hep B Core IgM Ab  Non-reactive  (NonReactive)   08/06/22  00:19    


 


Hepatitis C Antibody  Non-reactive  (NonReactive)   08/06/22  00:19    








                                Last Vital Signs











Temp  97.2 F L  08/06/22 20:21


 


Pulse  95 H  08/07/22 08:40


 


Resp  16   08/07/22 08:40


 


BP  121/81   08/07/22 08:40


 


Pulse Ox  94   08/07/22 07:30

## 2022-08-07 NOTE — PROGRESS NOTE
Assessment and Plan





- Patient Problems


(1) Vascular dementia with behavioral disturbance


Current Visit: Yes   Status: Acute   


Plan to address problem: 


Verbal prompting, verbal redirection, benzodiazepine therapy as clinically 

indicated.








(2) Cerebral atherosclerosis


Current Visit: Yes   Status: Acute   


Plan to address problem: 


Risk factor reduction, antiplatelet therapy, supportive care








(3) Osteoarthritis


Current Visit: Yes   Status: Acute   


Plan to address problem: 


Pain control, supportive care.








(4) Major depression


Current Visit: Yes   Status: Acute   


Plan to address problem: 


Continue medical management, supportive care.








(5) Bipolar 1 disorder


Current Visit: Yes   Status: Acute   


Plan to address problem: 


Continue medical management, supportive care








(6) Schizophrenia


Current Visit: Yes   Status: Acute   


Plan to address problem: 


Continue medical management, supportive care.








(7) Advance care planning


Current Visit: Yes   Status: Acute   


Plan to address problem: 


Disease education data, care plan discussed, diagnoses discussed, prognosis 

discussed, patient is full code, +30 minutes.








(8) Preventative health care


Current Visit: Yes   Status: Acute   


Plan to address problem: 


Patient counseled regarding home safety, compliant with medication, outpatient 

follow-up with primary care physician for all age and risk factor appropriate 

screening test.  +30 minutes.








History


Interval history: 


75 YO Female with Vascular Dementia with behavioral disturbance, Cerebral 

Atherosclerosis, OA, Peripheral Neuropathy, Bipolar Disorder, Schizophrenia, 

MDD, RENAE admitted to [facility] for psychiatric stabilization.  Consult 

placed by Dr. Wayne for medical management. Patient seen and evaluated in the 

recreation room.  No reported nursing events.  Patient appears to be at baseline

level of cognition and function.














Hospitalist Physical





- Constitutional


Vitals: 


                                        











Temp Pulse Resp BP Pulse Ox


 


 97.2 F L  95 H  18   121/81   94 


 


 08/06/22 20:21  08/07/22 08:40  08/07/22 10:18  08/07/22 08:40  08/07/22 07:30











General appearance: Present: no acute distress





- EENT


Eyes: Present: PERRL


ENT: hearing decreased





- Neck


Neck: Present: supple





- Respiratory


Respiratory effort: normal


Respiratory: bilateral: diminished





- Cardiovascular


Rhythm: regular


Heart Sounds: Present: S1 & S2





- Extremities


Extremities: no ischemia


Peripheral Pulses: within normal limits





- Abdominal


General gastrointestinal: soft, non-tender, non-distended





- Integumentary


Integumentary: Present: clear, dry





- Psychiatric


Psychiatric: cooperative





- Neurologic


Neurologic: CNII-XII intact





Results





- Labs


CBC & Chem 7: 


                                 08/06/22 00:19





                                 08/06/22 00:19


Labs: 


                             Laboratory Last Values











WBC  7.8 K/mm3 (4.5-11.0)   08/06/22  00:19    


 


RBC  4.37 M/mm3 (3.65-5.03)   08/06/22  00:19    


 


Hgb  12.5 gm/dl (10.1-14.3)   08/06/22  00:19    


 


Hct  37.6 % (30.3-42.9)   08/06/22  00:19    


 


MCV  86 fl (79-97)   08/06/22  00:19    


 


MCH  29 pg (28-32)   08/06/22  00:19    


 


MCHC  33 % (30-34)   08/06/22  00:19    


 


RDW  13.5 % (13.2-15.2)   08/06/22  00:19    


 


Plt Count  275 K/mm3 (140-440)   08/06/22  00:19    


 


Lymph % (Auto)  17.6 % (13.4-35.0)   08/06/22  00:19    


 


Mono % (Auto)  8.5 % (0.0-7.3)  H  08/06/22  00:19    


 


Eos % (Auto)  0.4 % (0.0-4.3)   08/06/22  00:19    


 


Baso % (Auto)  0.4 % (0.0-1.8)   08/06/22  00:19    


 


Lymph # (Auto)  1.4 K/mm3 (1.2-5.4)   08/06/22  00:19    


 


Mono # (Auto)  0.7 K/mm3 (0.0-0.8)   08/06/22  00:19    


 


Eos # (Auto)  0.0 K/mm3 (0.0-0.4)   08/06/22  00:19    


 


Baso # (Auto)  0.0 K/mm3 (0.0-0.1)   08/06/22  00:19    


 


Seg Neutrophils %  73.1 % (40.0-70.0)  H  08/06/22  00:19    


 


Seg Neutrophils #  5.7 K/mm3 (1.8-7.7)   08/06/22  00:19    


 


Sodium  144 mmol/L (137-145)   08/06/22  00:19    


 


Potassium  4.2 mmol/L (3.6-5.0)   08/06/22  00:19    


 


Chloride  107.4 mmol/L ()  H  08/06/22  00:19    


 


Carbon Dioxide  25 mmol/L (22-30)   08/06/22  00:19    


 


Anion Gap  16 mmol/L  08/06/22  00:19    


 


BUN  20 mg/dL (7-17)  H  08/06/22  00:19    


 


Creatinine  0.7 mg/dL (0.6-1.2)   08/06/22  00:19    


 


Estimated GFR  > 60 ml/min  08/06/22  00:19    


 


BUN/Creatinine Ratio  29 %  08/06/22  00:19    


 


Glucose  106 mg/dL ()  H  08/06/22  00:19    


 


POC Glucose  174 mg/dL ()  H  08/05/22  20:24    


 


Hemoglobin A1c  6.0 % (4-6)   08/06/22  00:19    


 


Calcium  9.7 mg/dL (8.4-10.2)   08/06/22  00:19    


 


Total Bilirubin  0.30 mg/dL (0.1-1.2)   08/06/22  00:19    


 


AST  11 units/L (5-40)   08/06/22  00:19    


 


ALT  7 units/L (7-56)   08/06/22  00:19    


 


Alkaline Phosphatase  179 units/L ()  H  08/06/22  00:19    


 


Total Protein  6.7 g/dL (6.3-8.2)   08/06/22  00:19    


 


Albumin  3.7 g/dL (3.9-5)  L  08/06/22  00:19    


 


Albumin/Globulin Ratio  1.2 %  08/06/22  00:19    


 


Triglycerides  90 mg/dL (2-149)   08/06/22  00:19    


 


Cholesterol  182 mg/dL ()   08/06/22  00:19    


 


LDL Cholesterol Direct  111 mg/dL ()   08/06/22  00:19    


 


HDL Cholesterol  50 mg/dL (40-59)   08/06/22  00:19    


 


Cholesterol/HDL Ratio  3.64 %  08/06/22  00:19    


 


TSH  2.170 mlU/mL (0.270-4.200)   08/06/22  00:19    


 


Hepatitis A IgM Ab  Non-reactive  (NonReactive)   08/06/22  00:19    


 


Hep Bs Antigen  Non-reactive  (Negative)   08/06/22  00:19    


 


Hep B Core IgM Ab  Non-reactive  (NonReactive)   08/06/22  00:19    


 


Hepatitis C Antibody  Non-reactive  (NonReactive)   08/06/22  00:19    











Clrak/IV: 


                                        





Voiding Method                   Toilet











Active Medications





- Current Medications


Current Medications: 














Generic Name Dose Route Start Last Admin





  Trade Name Christopher  PRN Reason Stop Dose Admin


 


Celecoxib  100 mg  08/06/22 10:00  08/07/22 10:18





  Celecoxib 100 Mg Cap  PO   100 mg





  QDAY ENIO   Administration


 


Cholecalciferol  5,000 unit  08/06/22 10:00  08/07/22 10:18





  Cholecalciferol (Vit D3) 5,000 Unit Tab  PO   5,000 unit





  QDAY ENIO   Administration


 


Donepezil HCl  5 mg  08/06/22 22:00  08/06/22 22:16





  Donepezil 5 Mg Tab  PO   5 mg





  QHS ENIO   Administration


 


Gabapentin  300 mg  08/05/22 22:00  08/07/22 10:18





  Gabapentin 300 Mg Cap  PO   300 mg





  BID ENIO   Administration


 


Hydroxyzine Pamoate  25 mg  08/06/22 10:00  08/07/22 10:18





  Hydroxyzine Pamoate 25 Mg Cap  PO   25 mg





  BID ENIO   Administration


 


Quetiapine Fumarate  50 mg  08/06/22 22:00  08/06/22 22:17





  Quetiapine 25 Mg Tab  PO   50 mg





  QHS ENIO   Administration

## 2022-08-07 NOTE — PROGRESS NOTE
Assessment and Plan





- Patient Problems


(1) Vascular dementia with behavioral disturbance


Current Visit: Yes   Status: Acute   


Plan to address problem: 


Verbal prompting, verbal redirection, benzodiazepine therapy as clinically 

indicated.








(2) Cerebral atherosclerosis


Current Visit: Yes   Status: Acute   


Plan to address problem: 


Risk factor reduction, antiplatelet therapy, supportive care








(3) Osteoarthritis


Current Visit: Yes   Status: Acute   


Plan to address problem: 


Pain control, supportive care.








(4) Major depression


Current Visit: Yes   Status: Acute   


Plan to address problem: 


Continue medical management, supportive care.








(5) Bipolar 1 disorder


Current Visit: Yes   Status: Acute   


Plan to address problem: 


Continue medical management, supportive care








(6) Schizophrenia


Current Visit: Yes   Status: Acute   


Plan to address problem: 


Continue medical management, supportive care.








(7) Advance care planning


Current Visit: Yes   Status: Acute   


Plan to address problem: 


Disease education data, care plan discussed, diagnoses discussed, prognosis 

discussed, patient is full code, +30 minutes.








(8) Preventative health care


Current Visit: Yes   Status: Acute   


Plan to address problem: 


Patient counseled regarding home safety, compliant with medication, outpatient 

follow-up with primary care physician for all age and risk factor appropriate 

screening test.  +30 minutes.








History


Interval history: 


77 YO Female with Vascular Dementia with behavioral disturbance, Cerebral 

Atherosclerosis, OA, Peripheral Neuropathy, Bipolar Disorder, Schizophrenia, 

MDD, RENAE admitted to [facility] for psychiatric stabilization.  Consult 

placed by Dr. Wayne for medical management. Patient seen and evaluated in the 

recreation room.  No reported nursing events.  Patient appears to be at baseline

level of cognition and function.














Hospitalist Physical





- Constitutional


Vitals: 


                                        











Temp Pulse Resp BP Pulse Ox


 


 97.2 F L  95 H  18   121/81   94 


 


 08/06/22 20:21  08/07/22 08:40  08/07/22 10:18  08/07/22 08:40  08/07/22 07:30











General appearance: Present: no acute distress





- EENT


Eyes: Present: PERRL


ENT: hearing decreased





- Neck


Neck: Present: supple





- Respiratory


Respiratory effort: normal


Respiratory: bilateral: diminished





- Cardiovascular


Rhythm: regular


Heart Sounds: Present: S1 & S2





- Extremities


Extremities: no ischemia


Peripheral Pulses: within normal limits





- Abdominal


General gastrointestinal: soft, non-tender, non-distended





- Integumentary


Integumentary: Present: clear, dry





- Psychiatric


Psychiatric: cooperative





- Neurologic


Neurologic: CNII-XII intact





Results





- Labs


CBC & Chem 7: 


                                 08/06/22 00:19





                                 08/06/22 00:19


Labs: 


                             Laboratory Last Values











WBC  7.8 K/mm3 (4.5-11.0)   08/06/22  00:19    


 


RBC  4.37 M/mm3 (3.65-5.03)   08/06/22  00:19    


 


Hgb  12.5 gm/dl (10.1-14.3)   08/06/22  00:19    


 


Hct  37.6 % (30.3-42.9)   08/06/22  00:19    


 


MCV  86 fl (79-97)   08/06/22  00:19    


 


MCH  29 pg (28-32)   08/06/22  00:19    


 


MCHC  33 % (30-34)   08/06/22  00:19    


 


RDW  13.5 % (13.2-15.2)   08/06/22  00:19    


 


Plt Count  275 K/mm3 (140-440)   08/06/22  00:19    


 


Lymph % (Auto)  17.6 % (13.4-35.0)   08/06/22  00:19    


 


Mono % (Auto)  8.5 % (0.0-7.3)  H  08/06/22  00:19    


 


Eos % (Auto)  0.4 % (0.0-4.3)   08/06/22  00:19    


 


Baso % (Auto)  0.4 % (0.0-1.8)   08/06/22  00:19    


 


Lymph # (Auto)  1.4 K/mm3 (1.2-5.4)   08/06/22  00:19    


 


Mono # (Auto)  0.7 K/mm3 (0.0-0.8)   08/06/22  00:19    


 


Eos # (Auto)  0.0 K/mm3 (0.0-0.4)   08/06/22  00:19    


 


Baso # (Auto)  0.0 K/mm3 (0.0-0.1)   08/06/22  00:19    


 


Seg Neutrophils %  73.1 % (40.0-70.0)  H  08/06/22  00:19    


 


Seg Neutrophils #  5.7 K/mm3 (1.8-7.7)   08/06/22  00:19    


 


Sodium  144 mmol/L (137-145)   08/06/22  00:19    


 


Potassium  4.2 mmol/L (3.6-5.0)   08/06/22  00:19    


 


Chloride  107.4 mmol/L ()  H  08/06/22  00:19    


 


Carbon Dioxide  25 mmol/L (22-30)   08/06/22  00:19    


 


Anion Gap  16 mmol/L  08/06/22  00:19    


 


BUN  20 mg/dL (7-17)  H  08/06/22  00:19    


 


Creatinine  0.7 mg/dL (0.6-1.2)   08/06/22  00:19    


 


Estimated GFR  > 60 ml/min  08/06/22  00:19    


 


BUN/Creatinine Ratio  29 %  08/06/22  00:19    


 


Glucose  106 mg/dL ()  H  08/06/22  00:19    


 


POC Glucose  174 mg/dL ()  H  08/05/22  20:24    


 


Hemoglobin A1c  6.0 % (4-6)   08/06/22  00:19    


 


Calcium  9.7 mg/dL (8.4-10.2)   08/06/22  00:19    


 


Total Bilirubin  0.30 mg/dL (0.1-1.2)   08/06/22  00:19    


 


AST  11 units/L (5-40)   08/06/22  00:19    


 


ALT  7 units/L (7-56)   08/06/22  00:19    


 


Alkaline Phosphatase  179 units/L ()  H  08/06/22  00:19    


 


Total Protein  6.7 g/dL (6.3-8.2)   08/06/22  00:19    


 


Albumin  3.7 g/dL (3.9-5)  L  08/06/22  00:19    


 


Albumin/Globulin Ratio  1.2 %  08/06/22  00:19    


 


Triglycerides  90 mg/dL (2-149)   08/06/22  00:19    


 


Cholesterol  182 mg/dL ()   08/06/22  00:19    


 


LDL Cholesterol Direct  111 mg/dL ()   08/06/22  00:19    


 


HDL Cholesterol  50 mg/dL (40-59)   08/06/22  00:19    


 


Cholesterol/HDL Ratio  3.64 %  08/06/22  00:19    


 


TSH  2.170 mlU/mL (0.270-4.200)   08/06/22  00:19    


 


Hepatitis A IgM Ab  Non-reactive  (NonReactive)   08/06/22  00:19    


 


Hep Bs Antigen  Non-reactive  (Negative)   08/06/22  00:19    


 


Hep B Core IgM Ab  Non-reactive  (NonReactive)   08/06/22  00:19    


 


Hepatitis C Antibody  Non-reactive  (NonReactive)   08/06/22  00:19    











Clark/IV: 


                                        





Voiding Method                   Toilet











Active Medications





- Current Medications


Current Medications: 














Generic Name Dose Route Start Last Admin





  Trade Name Christopher  PRN Reason Stop Dose Admin


 


Celecoxib  100 mg  08/06/22 10:00  08/07/22 10:18





  Celecoxib 100 Mg Cap  PO   100 mg





  QDAY ENIO   Administration


 


Cholecalciferol  5,000 unit  08/06/22 10:00  08/07/22 10:18





  Cholecalciferol (Vit D3) 5,000 Unit Tab  PO   5,000 unit





  QDAY ENIO   Administration


 


Donepezil HCl  5 mg  08/06/22 22:00  08/06/22 22:16





  Donepezil 5 Mg Tab  PO   5 mg





  QHS ENIO   Administration


 


Gabapentin  300 mg  08/05/22 22:00  08/07/22 10:18





  Gabapentin 300 Mg Cap  PO   300 mg





  BID ENIO   Administration


 


Hydroxyzine Pamoate  25 mg  08/06/22 10:00  08/07/22 10:18





  Hydroxyzine Pamoate 25 Mg Cap  PO   25 mg





  BID ENIO   Administration


 


Quetiapine Fumarate  50 mg  08/06/22 22:00  08/06/22 22:17





  Quetiapine 25 Mg Tab  PO   50 mg





  QHS ENIO   Administration

## 2022-08-08 RX ADMIN — CHOLECALCIFEROL TAB 125 MCG (5000 UNIT) SCH UNIT: 125 TAB at 14:21

## 2022-08-08 RX ADMIN — CELECOXIB SCH MG: 100 CAPSULE ORAL at 10:05

## 2022-08-08 RX ADMIN — GABAPENTIN SCH MG: 300 CAPSULE ORAL at 21:39

## 2022-08-08 RX ADMIN — DONEPEZIL HYDROCHLORIDE SCH MG: 5 TABLET, FILM COATED ORAL at 21:39

## 2022-08-08 RX ADMIN — GABAPENTIN SCH MG: 300 CAPSULE ORAL at 10:06

## 2022-08-08 NOTE — PROGRESS NOTE
Subjective


Date of service: 08/08/22


Subjective Comment: 


08/08: The patient was seen today. She reports being anxious and depressed 

rating as 4/10. The patient states her mood is " pretty good." She denies any 

current suicidal/homicidal ideation and denies hallucinations. No changes made 

today. 


08/07: The patient was seen this morning at breakfast. She reports feeling 

better. The patient continues to endorse depression, rates as 5/10. She reports 

sleep and appetite as good. The patient denies any current suicidal/homicidal 

ideation and denies hallucinations. 





REVIEW OF SYSTEMS 


Constitutional: Negative for weight loss


ENT: Negative for stridor


Respiratory: Negative for cough or hemoptysis


All other systems reviewed and are negative


MENTAL STATUS


General Appearance and Behavior: age appropriate, good eye contact, cooperative,

calm


Cooperation: Cooperative


Psychomotor Behavior: within normal limits


Mood: Depressed


Affect and affective range: Congruent with stated mood


Thought Process: goal oriented


Thought Content: reality oriented


Speech: Normal volume and Regular rate and rhythm


Suicidal Ideation: Denies


Homicidal Ideation: Denies HI


Hallucinations:Denies


Delusions: None elicited


Impulse Control: Limited


Insight and Judgment: Limited


Memory: Limited


Attention: Attentive


Orientation: alert and oriented





Diagnosis: Schizoaffective





Treatment Plan 


Patient will be admitted for inpatient psychiatric evaluation, medication 

adjustment and close monitoring


The patient's behavior, mood, sleep and appetite will be closely monitored.


Patient will be enrolled in individual and group therapeutic sessions and 

encouraged to attend.


Patient will be provided with a safe and structured environment.


Patient's physical health needs will be addressed by the Hospitalist. 

Hospitalist Consulted 


Labs including CBC, CMP, Lipid profile and Hemoglobin A1C ordered 


Social Assessment will be completed and the  will work with patient

and family to ensure a suitable and safe disposition


Medication adjustment will be made as clinically indicated


Usual Wellness Restoration/Preservation:


-Continue home meds


The patient agreed on the treatment plan, understood the risk, benefit, 

alternative treatment, potential consequence of no treatment, and gave informed 

consent.


Legal Status: voluntary


Reaction to Hospitalization: Accepting





Medications and Allergies


Patient Problems: 


Current Active Problems





Advance care planning (Acute)


Bipolar 1 disorder (Acute)


Cerebral atherosclerosis (Acute)


Major depression (Acute)


Osteoarthritis (Acute)


Preventative health care (Acute)


Schizophrenia (Acute)


Vascular dementia with behavioral disturbance (Acute)











Medications and Allergies








Medications and Allergies


                                    Allergies











Allergy/AdvReac Type Severity Reaction Status Date / Time


 


No Known Allergies Allergy   Unverified 01/28/22 13:04











                                Home Medications











 Medication  Instructions  Recorded  Confirmed  Last Taken  Type


 


Celecoxib [celeBREX] 100 mg PO QDAY 01/28/22 08/05/22 Unknown History


 


Gabapentin 300 mg PO BID 01/28/22 08/05/22 Unknown History


 


Gabapentin [Neurontin] 300 mg PO TID 08/05/22 08/05/22 Unknown History


 


QUEtiapine [SEROquel] 50 mg PO QHS 08/05/22 08/05/22 Unknown History


 


donepeziL [Aricept] 5 mg PO QHS 08/05/22 08/05/22 Unknown History


 


hydrOXYzine PAMOATE [Vistaril] 1 cap PO BID 08/05/22 08/05/22 Unknown History


 


traZODone [Desyrel] 50 mg PO QHS 08/05/22 08/05/22 Unknown History











Active Meds: 


Active Medications





Celecoxib (Celecoxib 100 Mg Cap)  100 mg PO QDAY [facility]


   Last Admin: 08/07/22 10:18 Dose:  100 mg


   


Cholecalciferol (Cholecalciferol (Vit D3) 5,000 Unit Tab)  5,000 unit PO QDAY 

[facility]


   Last Admin: 08/07/22 10:18 Dose:  5,000 unit


   


Donepezil HCl (Donepezil 5 Mg Tab)  5 mg PO QHS [facility]


   Last Admin: 08/07/22 21:26 Dose:  5 mg


   


Gabapentin (Gabapentin 300 Mg Cap)  300 mg PO BID [facility]


   Last Admin: 08/07/22 21:26 Dose:  300 mg


   


Hydroxyzine Pamoate (Hydroxyzine Pamoate 25 Mg Cap)  25 mg PO BID [facility]


   Last Admin: 08/07/22 21:26 Dose:  25 mg


   


Quetiapine Fumarate (Quetiapine 25 Mg Tab)  50 mg PO QHS [facility]


   Last Admin: 08/07/22 21:26 Dose:  50 mg


   











Results





- Results


Labs/Vitals: 


                             Laboratory Last Values











WBC  7.8 K/mm3 (4.5-11.0)   08/06/22  00:19    


 


RBC  4.37 M/mm3 (3.65-5.03)   08/06/22  00:19    


 


Hgb  12.5 gm/dl (10.1-14.3)   08/06/22  00:19    


 


Hct  37.6 % (30.3-42.9)   08/06/22  00:19    


 


MCV  86 fl (79-97)   08/06/22  00:19    


 


MCH  29 pg (28-32)   08/06/22  00:19    


 


MCHC  33 % (30-34)   08/06/22  00:19    


 


RDW  13.5 % (13.2-15.2)   08/06/22  00:19    


 


Plt Count  275 K/mm3 (140-440)   08/06/22  00:19    


 


Lymph % (Auto)  17.6 % (13.4-35.0)   08/06/22  00:19    


 


Mono % (Auto)  8.5 % (0.0-7.3)  H  08/06/22  00:19    


 


Eos % (Auto)  0.4 % (0.0-4.3)   08/06/22  00:19    


 


Baso % (Auto)  0.4 % (0.0-1.8)   08/06/22  00:19    


 


Lymph # (Auto)  1.4 K/mm3 (1.2-5.4)   08/06/22  00:19    


 


Mono # (Auto)  0.7 K/mm3 (0.0-0.8)   08/06/22  00:19    


 


Eos # (Auto)  0.0 K/mm3 (0.0-0.4)   08/06/22  00:19    


 


Baso # (Auto)  0.0 K/mm3 (0.0-0.1)   08/06/22  00:19    


 


Seg Neutrophils %  73.1 % (40.0-70.0)  H  08/06/22  00:19    


 


Seg Neutrophils #  5.7 K/mm3 (1.8-7.7)   08/06/22  00:19    


 


Sodium  144 mmol/L (137-145)   08/06/22  00:19    


 


Potassium  4.2 mmol/L (3.6-5.0)   08/06/22  00:19    


 


Chloride  107.4 mmol/L ()  H  08/06/22  00:19    


 


Carbon Dioxide  25 mmol/L (22-30)   08/06/22  00:19    


 


Anion Gap  16 mmol/L  08/06/22  00:19    


 


BUN  20 mg/dL (7-17)  H  08/06/22  00:19    


 


Creatinine  0.7 mg/dL (0.6-1.2)   08/06/22  00:19    


 


Estimated GFR  > 60 ml/min  08/06/22  00:19    


 


BUN/Creatinine Ratio  29 %  08/06/22  00:19    


 


Glucose  106 mg/dL ()  H  08/06/22  00:19    


 


POC Glucose  174 mg/dL ()  H  08/05/22  20:24    


 


Hemoglobin A1c  6.0 % (4-6)   08/06/22  00:19    


 


Calcium  9.7 mg/dL (8.4-10.2)   08/06/22  00:19    


 


Total Bilirubin  0.30 mg/dL (0.1-1.2)   08/06/22  00:19    


 


AST  11 units/L (5-40)   08/06/22  00:19    


 


ALT  7 units/L (7-56)   08/06/22  00:19    


 


Alkaline Phosphatase  179 units/L ()  H  08/06/22  00:19    


 


Total Protein  6.7 g/dL (6.3-8.2)   08/06/22  00:19    


 


Albumin  3.7 g/dL (3.9-5)  L  08/06/22  00:19    


 


Albumin/Globulin Ratio  1.2 %  08/06/22  00:19    


 


Triglycerides  90 mg/dL (2-149)   08/06/22  00:19    


 


Cholesterol  182 mg/dL ()   08/06/22  00:19    


 


LDL Cholesterol Direct  111 mg/dL ()   08/06/22  00:19    


 


HDL Cholesterol  50 mg/dL (40-59)   08/06/22  00:19    


 


Cholesterol/HDL Ratio  3.64 %  08/06/22  00:19    


 


TSH  2.170 mlU/mL (0.270-4.200)   08/06/22  00:19    


 


Hepatitis A IgM Ab  Non-reactive  (NonReactive)   08/06/22  00:19    


 


Hep Bs Antigen  Non-reactive  (Negative)   08/06/22  00:19    


 


Hep B Core IgM Ab  Non-reactive  (NonReactive)   08/06/22  00:19    


 


Hepatitis C Antibody  Non-reactive  (NonReactive)   08/06/22  00:19    








                                Last Vital Signs











Temp  97.2 F L  08/06/22 20:21


 


Pulse  95 H  08/07/22 08:40


 


Resp  18   08/07/22 10:18


 


BP  121/81   08/07/22 08:40


 


Pulse Ox  94   08/07/22 07:30

## 2022-08-09 RX ADMIN — GABAPENTIN SCH MG: 300 CAPSULE ORAL at 21:23

## 2022-08-09 RX ADMIN — CELECOXIB SCH MG: 100 CAPSULE ORAL at 09:46

## 2022-08-09 RX ADMIN — CHOLECALCIFEROL TAB 125 MCG (5000 UNIT) SCH UNIT: 125 TAB at 09:46

## 2022-08-09 RX ADMIN — DONEPEZIL HYDROCHLORIDE SCH MG: 5 TABLET, FILM COATED ORAL at 21:24

## 2022-08-09 RX ADMIN — GABAPENTIN SCH MG: 300 CAPSULE ORAL at 09:45

## 2022-08-09 NOTE — PROGRESS NOTE
Subjective


Date of service: 08/09/22


Subjective Comment: 


08/09: The patient was seen today. She reports she is doing better. She denies 

any current suicidal/homicidal ideation and denies hallucinations.





08/08: The patient was seen today. She reports being anxious and depressed 

rating as 4/10. The patient states her mood is " pretty good." She denies any 

current suicidal/homicidal ideation and denies hallucinations. No changes made 

today. 





08/07: The patient was seen this morning at breakfast. She reports feeling 

better. The patient continues to endorse depression, rates as 5/10. She reports 

sleep and appetite as good. The patient denies any current suicidal/homicidal 

ideation and denies hallucinations. 





REVIEW OF SYSTEMS 


Constitutional: Negative for weight loss


ENT: Negative for stridor


Respiratory: Negative for cough or hemoptysis


All other systems reviewed and are negative


MENTAL STATUS


General Appearance and Behavior: age appropriate, good eye contact, cooperative,

calm


Cooperation: Cooperative


Psychomotor Behavior: within normal limits


Mood: Depressed


Affect and affective range: Congruent with stated mood


Thought Process: goal oriented


Thought Content: reality oriented


Speech: Normal volume and Regular rate and rhythm


Suicidal Ideation: Denies


Homicidal Ideation: Denies HI


Hallucinations:Denies


Delusions: None elicited


Impulse Control: Limited


Insight and Judgment: Limited


Memory: Limited


Attention: Attentive


Orientation: alert and oriented





Diagnosis: Schizoaffective





Treatment Plan 


Patient will be admitted for inpatient psychiatric evaluation, medication 

adjustment and close monitoring


The patient's behavior, mood, sleep and appetite will be closely monitored.


Patient will be enrolled in individual and group therapeutic sessions and 

encouraged to attend.


Patient will be provided with a safe and structured environment.


Patient's physical health needs will be addressed by the Hospitalist. 

Hospitalist Consulted 


Labs including CBC, CMP, Lipid profile and Hemoglobin A1C ordered 


Social Assessment will be completed and the  will work with patient

and family to ensure a suitable and safe disposition


Medication adjustment will be made as clinically indicated


Usual Wellness Restoration/Preservation:


-Continue home meds


The patient agreed on the treatment plan, understood the risk, benefit, 

alternative treatment, potential consequence of no treatment, and gave informed 

consent.


Legal Status: voluntary


Reaction to Hospitalization: Accepting





Medications and Allergies


Patient Problems: 


Current Active Problems





Advance care planning (Acute)


Bipolar 1 disorder (Acute)


Cerebral atherosclerosis (Acute)


Major depression (Acute)


Osteoarthritis (Acute)


Preventative health care (Acute)


Schizophrenia (Acute)


Vascular dementia with behavioral disturbance (Acute)











Medications and Allergies


                                    Allergies











Allergy/AdvReac Type Severity Reaction Status Date / Time


 


No Known Allergies Allergy   Unverified 01/28/22 13:04











                                Home Medications











 Medication  Instructions  Recorded  Confirmed  Last Taken  Type


 


Celecoxib [celeBREX] 100 mg PO QDAY 01/28/22 08/05/22 Unknown History


 


Gabapentin 300 mg PO BID 01/28/22 08/05/22 Unknown History


 


Gabapentin [Neurontin] 300 mg PO TID 08/05/22 08/05/22 Unknown History


 


QUEtiapine [SEROquel] 50 mg PO QHS 08/05/22 08/05/22 Unknown History


 


donepeziL [Aricept] 5 mg PO QHS 08/05/22 08/05/22 Unknown History


 


hydrOXYzine PAMOATE [Vistaril] 1 cap PO BID 08/05/22 08/05/22 Unknown History


 


traZODone [Desyrel] 50 mg PO QHS 08/05/22 08/05/22 Unknown History











Active Meds: 


Active Medications





Celecoxib (Celecoxib 100 Mg Cap)  100 mg PO QDAY [facility]


   Last Admin: 08/08/22 10:05 Dose:  100 mg


   


Cholecalciferol (Cholecalciferol (Vit D3) 5,000 Unit Tab)  5,000 unit PO QDAY 

[facility]


   Last Admin: 08/08/22 14:21 Dose:  5,000 unit


   


Donepezil HCl (Donepezil 5 Mg Tab)  5 mg PO QHS [facility]


   Last Admin: 08/08/22 21:39 Dose:  5 mg


   


Gabapentin (Gabapentin 300 Mg Cap)  300 mg PO BID [facility]


   Last Admin: 08/08/22 21:39 Dose:  300 mg


   


Hydroxyzine Pamoate (Hydroxyzine Pamoate 25 Mg Cap)  25 mg PO BID [facility]


   Last Admin: 08/08/22 21:39 Dose:  25 mg


   


Quetiapine Fumarate (Quetiapine 25 Mg Tab)  50 mg PO QHS [facility]


   Last Admin: 08/08/22 21:39 Dose:  50 mg


   











Results





- Results


Labs/Vitals: 


                             Laboratory Last Values











WBC  7.8 K/mm3 (4.5-11.0)   08/06/22  00:19    


 


RBC  4.37 M/mm3 (3.65-5.03)   08/06/22  00:19    


 


Hgb  12.5 gm/dl (10.1-14.3)   08/06/22  00:19    


 


Hct  37.6 % (30.3-42.9)   08/06/22  00:19    


 


MCV  86 fl (79-97)   08/06/22  00:19    


 


MCH  29 pg (28-32)   08/06/22  00:19    


 


MCHC  33 % (30-34)   08/06/22  00:19    


 


RDW  13.5 % (13.2-15.2)   08/06/22  00:19    


 


Plt Count  275 K/mm3 (140-440)   08/06/22  00:19    


 


Lymph % (Auto)  17.6 % (13.4-35.0)   08/06/22  00:19    


 


Mono % (Auto)  8.5 % (0.0-7.3)  H  08/06/22  00:19    


 


Eos % (Auto)  0.4 % (0.0-4.3)   08/06/22  00:19    


 


Baso % (Auto)  0.4 % (0.0-1.8)   08/06/22  00:19    


 


Lymph # (Auto)  1.4 K/mm3 (1.2-5.4)   08/06/22  00:19    


 


Mono # (Auto)  0.7 K/mm3 (0.0-0.8)   08/06/22  00:19    


 


Eos # (Auto)  0.0 K/mm3 (0.0-0.4)   08/06/22  00:19    


 


Baso # (Auto)  0.0 K/mm3 (0.0-0.1)   08/06/22  00:19    


 


Seg Neutrophils %  73.1 % (40.0-70.0)  H  08/06/22  00:19    


 


Seg Neutrophils #  5.7 K/mm3 (1.8-7.7)   08/06/22  00:19    


 


Sodium  144 mmol/L (137-145)   08/06/22  00:19    


 


Potassium  4.2 mmol/L (3.6-5.0)   08/06/22  00:19    


 


Chloride  107.4 mmol/L ()  H  08/06/22  00:19    


 


Carbon Dioxide  25 mmol/L (22-30)   08/06/22  00:19    


 


Anion Gap  16 mmol/L  08/06/22  00:19    


 


BUN  20 mg/dL (7-17)  H  08/06/22  00:19    


 


Creatinine  0.7 mg/dL (0.6-1.2)   08/06/22  00:19    


 


Estimated GFR  > 60 ml/min  08/06/22  00:19    


 


BUN/Creatinine Ratio  29 %  08/06/22  00:19    


 


Glucose  106 mg/dL ()  H  08/06/22  00:19    


 


POC Glucose  174 mg/dL ()  H  08/05/22  20:24    


 


Hemoglobin A1c  6.0 % (4-6)   08/06/22  00:19    


 


Calcium  9.7 mg/dL (8.4-10.2)   08/06/22  00:19    


 


Total Bilirubin  0.30 mg/dL (0.1-1.2)   08/06/22  00:19    


 


AST  11 units/L (5-40)   08/06/22  00:19    


 


ALT  7 units/L (7-56)   08/06/22  00:19    


 


Alkaline Phosphatase  179 units/L ()  H  08/06/22  00:19    


 


Total Protein  6.7 g/dL (6.3-8.2)   08/06/22  00:19    


 


Albumin  3.7 g/dL (3.9-5)  L  08/06/22  00:19    


 


Albumin/Globulin Ratio  1.2 %  08/06/22  00:19    


 


Triglycerides  90 mg/dL (2-149)   08/06/22  00:19    


 


Cholesterol  182 mg/dL ()   08/06/22  00:19    


 


LDL Cholesterol Direct  111 mg/dL ()   08/06/22  00:19    


 


HDL Cholesterol  50 mg/dL (40-59)   08/06/22  00:19    


 


Cholesterol/HDL Ratio  3.64 %  08/06/22  00:19    


 


TSH  2.170 mlU/mL (0.270-4.200)   08/06/22  00:19    


 


Hepatitis A IgM Ab  Non-reactive  (NonReactive)   08/06/22  00:19    


 


Hep Bs Antigen  Non-reactive  (Negative)   08/06/22  00:19    


 


Hep B Core IgM Ab  Non-reactive  (NonReactive)   08/06/22  00:19    


 


Hepatitis C Antibody  Non-reactive  (NonReactive)   08/06/22  00:19    








                                Last Vital Signs











Temp  98.7 F   08/08/22 20:06


 


Pulse  94 H  08/08/22 20:06


 


Resp  18   08/08/22 20:06


 


BP  134/75   08/08/22 20:06


 


Pulse Ox  94   08/08/22 20:06

## 2022-08-10 VITALS — DIASTOLIC BLOOD PRESSURE: 71 MMHG | SYSTOLIC BLOOD PRESSURE: 113 MMHG

## 2022-08-10 RX ADMIN — CHOLECALCIFEROL TAB 125 MCG (5000 UNIT) SCH UNIT: 125 TAB at 09:50

## 2022-08-10 RX ADMIN — GABAPENTIN SCH MG: 300 CAPSULE ORAL at 09:40

## 2022-08-10 RX ADMIN — CELECOXIB SCH MG: 100 CAPSULE ORAL at 09:40

## 2022-08-10 NOTE — PROGRESS NOTE
Assessment and Plan





Assessment and Plan





- Patient Problems


(1) Vascular dementia with behavioral disturbance


Current Visit: Yes   Status: Acute   


Plan to address problem: 


Verbal prompting, verbal redirection, benzodiazepine therapy as clinically 

indicated.








(2) Cerebral atherosclerosis


Current Visit: Yes   Status: Acute   


Plan to address problem: 


Risk factor reduction, antiplatelet therapy, supportive care








(3) Osteoarthritis


Current Visit: Yes   Status: Acute   


Plan to address problem: 


Pain control, supportive care.








(4) Major depression


Current Visit: Yes   Status: Acute   


Plan to address problem: 


Continue medical management, supportive care.








(5) Bipolar 1 disorder


Current Visit: Yes   Status: Acute   


Plan to address problem: 


Continue medical management, supportive care








(6) Schizophrenia


Current Visit: Yes   Status: Acute   


Plan to address problem: 


Continue medical management, supportive care.








(7) Advance care planning


Current Visit: Yes   Status: Acute   


Plan to address problem: 


Disease education data, care plan discussed, diagnoses discussed, prognosis 

discussed, patient is full code, +30 minutes.








(8) Preventative health care


Current Visit: Yes   Status: Acute   


Plan to address problem: 


Patient counseled regarding home safety, compliant with medication, outpatient 

follow-up with primary care physician for all age and risk factor appropriate 

screening test.  +30 minutes.








Subjective


Date of service: 08/08/22


Principal diagnosis: Vascular dementia with behavioral disturbances


Interval history: 


History


Interval history: 


77 YO Female with Vascular Dementia with behavioral disturbance, Cerebral 

Atherosclerosis, OA, Peripheral Neuropathy, Bipolar Disorder, Schizophrenia, 

MDD, RENAE admitted to [facility] for psychiatric stabilization.  Consult 

placed by Dr. Wayne for medical management. Patient seen and evaluated in the 

recreation room.  No reported nursing events.  Patient appears to be at baseline

level of cognition and function.








Objective





- Constitutional


Vitals: 


                               Vital Signs - 12hr











  08/09/22





  19:45


 


Temperature 97.8 F


 


Pulse Rate 89


 


Respiratory 18





Rate 


 


Blood Pressure 134/74


 


O2 Sat by Pulse 91





Oximetry 











General appearance: Present: no acute distress, well-nourished





- EENT


Eyes: PERRL, EOM intact


ENT: hearing intact, clear oral mucosa


Ears: bilateral: normal





- Neck


Neck: supple, normal ROM





- Respiratory


Respiratory effort: normal


Respiratory: bilateral: CTA





- Breasts


Breasts: normal





- Cardiovascular


Heart rate: 78


Rhythm: regular


Heart Sounds: Present: S1 & S2.  Absent: gallop, rub


Extremities: pulses intact, No edema, normal color, Full ROM





- Gastrointestinal


General gastrointestinal: Present: soft, non-tender, non-distended, normal bowel

sounds





- Genitourinary


Female genitourinary: normal





- Integumentary


Integumentary: clear, warm, dry





- Musculoskeletal


Musculoskeletal: 1, strength equal bilaterally





- Neurologic


Neurologic: moves all extremities





- Psychiatric


Psychiatric: memory intact, appropriate mood/affect, intact judgment & insight





- Labs


CBC & Chem 7: 


                                 08/06/22 00:19





                                 08/06/22 00:19

## 2022-08-10 NOTE — PROGRESS NOTE
Assessment and Plan





Assessment and Plan





- Patient Problems


(1) Vascular dementia with behavioral disturbance


Current Visit: Yes   Status: Acute   


Plan to address problem: 


Verbal prompting, verbal redirection, benzodiazepine therapy as clinically 

indicated.








(2) Cerebral atherosclerosis


Current Visit: Yes   Status: Acute   


Plan to address problem: 


Risk factor reduction, antiplatelet therapy, supportive care








(3) Osteoarthritis


Current Visit: Yes   Status: Acute   


Plan to address problem: 


Pain control, supportive care.








(4) Major depression


Current Visit: Yes   Status: Acute   


Plan to address problem: 


Continue medical management, supportive care.








(5) Bipolar 1 disorder


Current Visit: Yes   Status: Acute   


Plan to address problem: 


Continue medical management, supportive care








(6) Schizophrenia


Current Visit: Yes   Status: Acute   


Plan to address problem: 


Continue medical management, supportive care.








(7) Advance care planning


Current Visit: Yes   Status: Acute   


Plan to address problem: 


Disease education data, care plan discussed, diagnoses discussed, prognosis 

discussed, patient is full code, +30 minutes.








(8) Preventative health care


Current Visit: Yes   Status: Acute   


Plan to address problem: 


Patient counseled regarding home safety, compliant with medication, outpatient 

follow-up with primary care physician for all age and risk factor appropriate 

screening test.  +30 minutes.








Subjective


Date of service: 08/09/22


Principal diagnosis: Vascular dementia with behavioral disturbances


Interval history: 


History


Interval history: 


75 YO Female with Vascular Dementia with behavioral disturbance, Cerebral 

Atherosclerosis, OA, Peripheral Neuropathy, Bipolar Disorder, Schizophrenia, 

MDD, RENAE admitted to [facility] for psychiatric stabilization.  Consult 

placed by Dr. Wayne for medical management. Patient seen and evaluated in the 

recreation room.  No reported nursing events.  Patient appears to be at baseline

level of cognition and function.








Objective





- Constitutional


Vitals: 


                               Vital Signs - 12hr











  08/09/22





  19:45


 


Temperature 97.8 F


 


Pulse Rate 89


 


Respiratory 18





Rate 


 


Blood Pressure 134/74


 


O2 Sat by Pulse 91





Oximetry 











General appearance: Present: no acute distress, well-nourished





- EENT


Eyes: PERRL, EOM intact


ENT: hearing intact, clear oral mucosa


Ears: bilateral: normal





- Neck


Neck: supple, normal ROM





- Respiratory


Respiratory effort: normal


Respiratory: bilateral: CTA





- Breasts


Breasts: normal





- Cardiovascular


Heart rate: 78


Rhythm: regular


Heart Sounds: Present: S1 & S2.  Absent: gallop, rub


Extremities: pulses intact, No edema, normal color, Full ROM





- Gastrointestinal


General gastrointestinal: Present: soft, non-tender, non-distended, normal bowel

sounds





- Genitourinary


Female genitourinary: normal





- Integumentary


Integumentary: clear, warm, dry





- Musculoskeletal


Musculoskeletal: 1, strength equal bilaterally





- Neurologic


Neurologic: moves all extremities





- Psychiatric


Psychiatric: memory intact, appropriate mood/affect, intact judgment & insight





- Labs


CBC & Chem 7: 


                                 08/06/22 00:19





                                 08/06/22 00:19

## 2022-08-10 NOTE — DISCHARGE SUMMARY
Providers





- Providers


Date of Admission: 


08/05/22 18:45





Date of discharge: 08/10/22


Attending physician: 


AIXA CHUN MD





                                        





08/08/22 12:07


Consult to Physician [CONS] Routine 


   Comment: 


   Consulting Provider: JESSICA SALGUERO


   Physician Instructions: 


   Reason For Exam: New admit











Primary care physician: 


PRIMARY CARE MD








Hospitalization


Reason for admission: Suicidal ideation


Admitting Diagnosis: F25.1 - SCHIZOAFFECTIVE DISORDER, DEPRESSIVE TYPE


Condition: Stable


Hospital course: 


The patient was provided inpatient psychiatric treatment with safe and 

supportive environment, group/individual therapy, psychiatric medication, 

medication adjustment, adverse effect monitor, medical evaluation, medical 

treatment, social service assessment, social support meeting, placement 

assessment and psycho-education. The patients mood, cognition, behavior, 

motivation, compliance to treatment and appreciation on family/social support 

are improved and stabilized. At the time of discharge, the patient had no 

suicidal ideas, no homicidal ideas, no aggressive thoughts, no endangering 

behavior and no debilitating adverse effects. The patient agreed on the 

treatment plan, understood the risk, benefit, alternative treatment, potential 

consequence of no treatment, and gave informed consent.





Progress Note:


08/09: The patient was seen today. She reports she is doing better. She denies 

any current suicidal/homicidal ideation and denies hallucinations.





08/08: The patient was seen today. She reports being anxious and depressed 

rating as 4/10. The patient states her mood is " pretty good." She denies any 

current suicidal/homicidal ideation and denies hallucinations. No changes made 

today. 





08/07: The patient was seen this morning at breakfast. She reports feeling 

better. The patient continues to endorse depression, rates as 5/10. She reports 

sleep and appetite as good. The patient denies any current suicidal/homicidal 

ideation and denies hallucinations. 





Disposition: 30 STILL A PATIENT


Allergies/Adverse Reactions: 


                                    Allergies





No Known Allergies Allergy (Unverified 01/28/22 13:04)


   








Vital Signs: 


                                Last Vital Signs











Temp  97.8 F   08/09/22 19:45


 


Pulse  89   08/09/22 19:45


 


Resp  18   08/09/22 19:45


 


BP  134/74   08/09/22 19:45


 


Pulse Ox  91   08/09/22 19:45











Last Lab: 


                             Laboratory Last Values











WBC  7.8 K/mm3 (4.5-11.0)   08/06/22  00:19    


 


RBC  4.37 M/mm3 (3.65-5.03)   08/06/22  00:19    


 


Hgb  12.5 gm/dl (10.1-14.3)   08/06/22  00:19    


 


Hct  37.6 % (30.3-42.9)   08/06/22  00:19    


 


MCV  86 fl (79-97)   08/06/22  00:19    


 


MCH  29 pg (28-32)   08/06/22  00:19    


 


MCHC  33 % (30-34)   08/06/22  00:19    


 


RDW  13.5 % (13.2-15.2)   08/06/22  00:19    


 


Plt Count  275 K/mm3 (140-440)   08/06/22  00:19    


 


Lymph % (Auto)  17.6 % (13.4-35.0)   08/06/22  00:19    


 


Mono % (Auto)  8.5 % (0.0-7.3)  H  08/06/22  00:19    


 


Eos % (Auto)  0.4 % (0.0-4.3)   08/06/22  00:19    


 


Baso % (Auto)  0.4 % (0.0-1.8)   08/06/22  00:19    


 


Lymph # (Auto)  1.4 K/mm3 (1.2-5.4)   08/06/22  00:19    


 


Mono # (Auto)  0.7 K/mm3 (0.0-0.8)   08/06/22  00:19    


 


Eos # (Auto)  0.0 K/mm3 (0.0-0.4)   08/06/22  00:19    


 


Baso # (Auto)  0.0 K/mm3 (0.0-0.1)   08/06/22  00:19    


 


Seg Neutrophils %  73.1 % (40.0-70.0)  H  08/06/22  00:19    


 


Seg Neutrophils #  5.7 K/mm3 (1.8-7.7)   08/06/22  00:19    


 


Sodium  144 mmol/L (137-145)   08/06/22  00:19    


 


Potassium  4.2 mmol/L (3.6-5.0)   08/06/22  00:19    


 


Chloride  107.4 mmol/L ()  H  08/06/22  00:19    


 


Carbon Dioxide  25 mmol/L (22-30)   08/06/22  00:19    


 


Anion Gap  16 mmol/L  08/06/22  00:19    


 


BUN  20 mg/dL (7-17)  H  08/06/22  00:19    


 


Creatinine  0.7 mg/dL (0.6-1.2)   08/06/22  00:19    


 


Estimated GFR  > 60 ml/min  08/06/22  00:19    


 


BUN/Creatinine Ratio  29 %  08/06/22  00:19    


 


Glucose  106 mg/dL ()  H  08/06/22  00:19    


 


POC Glucose  174 mg/dL ()  H  08/05/22  20:24    


 


Hemoglobin A1c  6.0 % (4-6)   08/06/22  00:19    


 


Calcium  9.7 mg/dL (8.4-10.2)   08/06/22  00:19    


 


Total Bilirubin  0.30 mg/dL (0.1-1.2)   08/06/22  00:19    


 


AST  11 units/L (5-40)   08/06/22  00:19    


 


ALT  7 units/L (7-56)   08/06/22  00:19    


 


Alkaline Phosphatase  179 units/L ()  H  08/06/22  00:19    


 


Total Protein  6.7 g/dL (6.3-8.2)   08/06/22  00:19    


 


Albumin  3.7 g/dL (3.9-5)  L  08/06/22  00:19    


 


Albumin/Globulin Ratio  1.2 %  08/06/22  00:19    


 


Triglycerides  90 mg/dL (2-149)   08/06/22  00:19    


 


Cholesterol  182 mg/dL ()   08/06/22  00:19    


 


LDL Cholesterol Direct  111 mg/dL ()   08/06/22  00:19    


 


HDL Cholesterol  50 mg/dL (40-59)   08/06/22  00:19    


 


Cholesterol/HDL Ratio  3.64 %  08/06/22  00:19    


 


TSH  2.170 mlU/mL (0.270-4.200)   08/06/22  00:19    


 


Hepatitis A IgM Ab  Non-reactive  (NonReactive)   08/06/22  00:19    


 


Hep Bs Antigen  Non-reactive  (Negative)   08/06/22  00:19    


 


Hep B Core IgM Ab  Non-reactive  (NonReactive)   08/06/22  00:19    


 


Hepatitis C Antibody  Non-reactive  (NonReactive)   08/06/22  00:19    














Core Measure Documentation





- Palliative Care


Palliative Care/ Comfort Measures: Not Applicable





- Core Measures


Any of the following diagnoses?: none





- VTE Discharge Requirements


Deep Vein Thrombosis/Pulmonary Embolism Present on Admission: No





Exam





- Constitutional


Vitals: 


                                        











Temp Pulse Resp BP Pulse Ox


 


 97.8 F   89   18   134/74   91 


 


 08/09/22 19:45  08/09/22 19:45  08/09/22 19:45  08/09/22 19:45  08/09/22 19:45














Plan


Activity: advance as tolerated


Weight Bearing Status: Weight Bear as Tolerated


Diet: regular


Care Plan Goals: 


Maintain good and stable mental health.


Plan of Treatment: 


The patient should be compliant with medications, not to use drugs and not to 

drink alcohol.The patient understands that if suicidal ideas, homicidal ideas, 

or any endangering thoughts/behavior arise, they should immediately seek for 

emergent assistance including but not limited to crisis hot line and emergency 

room. Follow up with outpatient Psychiatrist and PCP within 7 - 14 days of 

discharge.





Follow up with: 


PRIMARY CARE,MD [Primary Care Provider] - 7 Days


Prescriptions: 


traZODone [Desyrel] 50 mg PO QHS 30 Days #30


QUEtiapine [SEROquel] 50 mg PO QHS 30 Days #30 tablet


Gabapentin 300 mg PO BID 30 Days #60 capsule